# Patient Record
Sex: FEMALE | Race: BLACK OR AFRICAN AMERICAN | NOT HISPANIC OR LATINO | Employment: FULL TIME | URBAN - METROPOLITAN AREA
[De-identification: names, ages, dates, MRNs, and addresses within clinical notes are randomized per-mention and may not be internally consistent; named-entity substitution may affect disease eponyms.]

---

## 2018-03-15 ENCOUNTER — PATIENT OUTREACH (OUTPATIENT)
Dept: ADMINISTRATIVE | Facility: HOSPITAL | Age: 43
End: 2018-03-15

## 2018-03-15 NOTE — LETTER
March 15, 2018    Pedro Pablo Bazan  [address] [facility] [address]           [facility]  [address]  [facility] [address]  Phone: [phone] Dear Mrs. Bazan:    Dear Pedro Pablo Bazan    In the spirit of maintaining your good health, our system indicates that you have not been seen in the office in over 12 months and due for a visit.     Our system indicates that you are due for the following:   Health Maintenance Due   Topic Date Due    TETANUS VACCINE  08/21/1992    Pap Smear with HPV Cotest  08/21/1995    Mammogram  08/21/2014    Influenza Vaccine  08/01/2017     Mic Arvizu MD would like you to schedule an appointment for an annual exam at your earliest convenience. If you have completed any of these health maintenance requirements at an outside facility, please contact our office so we may update your health record.    If your PRIMARY CARE PHYSICIAN has changed please contact your insurance company to reflect the correct physician.    If you have any issues or need assistance in scheduling this appointment, please call the number below.       If you have any questions or concerns, please don't hesitate to call.    Sincerely,    LANA Heard  Care Coordination Department  [facility]  [phone]

## 2018-03-15 NOTE — PROGRESS NOTES
Contact pt to schedule a Pap, physical and update health maintenance. Pt stated she has to check with insurance and will call back. A letter mail with contact # for reference.

## 2018-06-22 DIAGNOSIS — Z12.39 BREAST CANCER SCREENING: ICD-10-CM

## 2019-09-12 ENCOUNTER — HOSPITAL ENCOUNTER (OUTPATIENT)
Dept: RADIOLOGY | Facility: HOSPITAL | Age: 44
Discharge: HOME | End: 2019-09-12
Attending: NURSE PRACTITIONER
Payer: COMMERCIAL

## 2019-09-12 ENCOUNTER — OFFICE VISIT (OUTPATIENT)
Dept: FAMILY MEDICINE | Facility: CLINIC | Age: 44
End: 2019-09-12
Payer: COMMERCIAL

## 2019-09-12 VITALS
WEIGHT: 119.5 LBS | TEMPERATURE: 98 F | SYSTOLIC BLOOD PRESSURE: 140 MMHG | BODY MASS INDEX: 20.4 KG/M2 | DIASTOLIC BLOOD PRESSURE: 76 MMHG | HEART RATE: 71 BPM | HEIGHT: 64 IN

## 2019-09-12 DIAGNOSIS — M25.511 CHRONIC PAIN OF BOTH SHOULDERS: ICD-10-CM

## 2019-09-12 DIAGNOSIS — M25.512 CHRONIC PAIN OF BOTH SHOULDERS: ICD-10-CM

## 2019-09-12 DIAGNOSIS — S19.9XXD: ICD-10-CM

## 2019-09-12 DIAGNOSIS — R49.0 HOARSENESS: ICD-10-CM

## 2019-09-12 DIAGNOSIS — G89.29 CHRONIC PAIN OF BOTH SHOULDERS: ICD-10-CM

## 2019-09-12 DIAGNOSIS — S19.9XXD: Primary | ICD-10-CM

## 2019-09-12 PROCEDURE — 99999 PR PBB SHADOW E&M-EST. PATIENT-LVL V: ICD-10-PCS | Mod: PBBFAC,,, | Performed by: NURSE PRACTITIONER

## 2019-09-12 PROCEDURE — 76536 US EXAM OF HEAD AND NECK: CPT | Mod: TC,PO

## 2019-09-12 PROCEDURE — 76536 US SOFT TISSUE HEAD NECK THYROID: ICD-10-PCS | Mod: 26,,, | Performed by: RADIOLOGY

## 2019-09-12 PROCEDURE — 70360 X-RAY EXAM OF NECK: CPT | Mod: 26,,, | Performed by: RADIOLOGY

## 2019-09-12 PROCEDURE — 99214 PR OFFICE/OUTPT VISIT, EST, LEVL IV, 30-39 MIN: ICD-10-PCS | Mod: S$GLB,,, | Performed by: NURSE PRACTITIONER

## 2019-09-12 PROCEDURE — 76536 US EXAM OF HEAD AND NECK: CPT | Mod: 26,,, | Performed by: RADIOLOGY

## 2019-09-12 PROCEDURE — 99214 OFFICE O/P EST MOD 30 MIN: CPT | Mod: S$GLB,,, | Performed by: NURSE PRACTITIONER

## 2019-09-12 PROCEDURE — 73030 X-RAY EXAM OF SHOULDER: CPT | Mod: 26,,, | Performed by: RADIOLOGY

## 2019-09-12 PROCEDURE — 73030 XR SHOULDER COMPLETE 2 OR MORE VIEWS BILATERAL: ICD-10-PCS | Mod: 26,,, | Performed by: RADIOLOGY

## 2019-09-12 PROCEDURE — 99999 PR PBB SHADOW E&M-EST. PATIENT-LVL V: CPT | Mod: PBBFAC,,, | Performed by: NURSE PRACTITIONER

## 2019-09-12 PROCEDURE — 3008F BODY MASS INDEX DOCD: CPT | Mod: CPTII,S$GLB,, | Performed by: NURSE PRACTITIONER

## 2019-09-12 PROCEDURE — 73030 X-RAY EXAM OF SHOULDER: CPT | Mod: TC,50,PO

## 2019-09-12 PROCEDURE — 70360 XR NECK SOFT TISSUE: ICD-10-PCS | Mod: 26,,, | Performed by: RADIOLOGY

## 2019-09-12 PROCEDURE — 70360 X-RAY EXAM OF NECK: CPT | Mod: TC,PO

## 2019-09-12 PROCEDURE — 3008F PR BODY MASS INDEX (BMI) DOCUMENTED: ICD-10-PCS | Mod: CPTII,S$GLB,, | Performed by: NURSE PRACTITIONER

## 2019-09-12 RX ORDER — MELOXICAM 7.5 MG/1
7.5 TABLET ORAL DAILY
Qty: 30 TABLET | Refills: 0 | Status: SHIPPED | OUTPATIENT
Start: 2019-09-12 | End: 2020-03-10

## 2019-09-12 NOTE — PROGRESS NOTES
Referring Provider:    Samia Weber, Np  [address] [place] [address]  Subjective:   Patient: Pedro Pablo Bazan [id number], :1975   Visit date:2019 10:42 AM    Chief Complaint:  Other (fall and hit neck on table 2wks ago) and Hoarse (started 2wks ago)    HPI:  Pedro Pablo is a 44 y.o. female who I was asked to see in consultation for evaluation of the following issue(s):    Patient reported a fall 2 weeks ago. She fell while in her kitchen hitting her anterior neck on the edge of her table. Since, she has had little to no voice. She has never had any issues with her voice prior to the fall. She denied any pain. No dysphagia. No open wounds/bleeding. She was seen by her PCP and w/u with US and Xray are insignificant. No other complaints. No relieving factors.     Review of Systems:  Negative unless checked off.  Gen:  []fever   []fatigue  HENT:  []nosebleeds  []dental problem   Eyes:  []photophobia  []visual disturbance  Resp:  []chest tightness []wheezing  Card:  []chest pain  []leg swelling  GI:  []abdominal pain []blood in stool  :  []dysuria  []hematuria  Musc:  []joint swelling  []gait problem  Skin:  []color change  []pallor  Neuro:  []seizures  []numbness  Hem:  []bruise/bleed easily  Psych:  []hallucinations  []behavioral problems  Allergy/Imm: has No Known Allergies.    Her meds, allergies, medical, surgical, social & family histories were reviewed & updated:  -     She has a current medication list which includes the following prescription(s): ferrous sulfate and meloxicam.  -     She  has no past medical history on file.   -     She does not have a problem list on file.   -     She  has no past surgical history on file.  -     She  reports that she has never smoked. She has never used smokeless tobacco.  -     Her family history is not on file.  -     She has No Known Allergies.    Objective:     Physical Exam:  Vitals:  /76   Pulse 90   Temp 99.1 °F (37.3 °C)  (Oral)   Wt 54.4 kg (120 lb)   LMP 08/22/2019   BMI 20.60 kg/m²   General appearance:  Well developed, well nourished    Eyes:  Extraocular motions intact, PERRL    Communication:  no hoarseness, no dysphonia    Ears:  Otoscopy of external auditory canals and tympanic membranes was normal, clinical speech reception thresholds grossly intact, no mass/lesion of auricle.  Nose:  No masses/lesions of external nose, nasal mucosa, septum, and turbinates were within normal limits.  Mouth:  No mass/lesion of lips, teeth, gums, hard/soft palate, tongue, tonsils, or oropharynx.    Cardiovascular:  No pedal edema; Radial Pulses +2     Neck & Lymphatics:  No cervical lymphadenopathy, no neck mass/crepitus/ asymmetry, trachea is midline, no thyroid enlargement/tenderness/mass.    Psych: Oriented x3,  Alert with normal mood and affect.     Respiration/Chest:  Symmetric expansion during respiration, normal respiratory effort.    Skin:  Warm and intact. No ulcerations of face, scalp, neck.    US SOFT TISSUE HEAD NECK THYROID    CLINICAL HISTORY:  Unspecified injury of neck, subsequent encounter    TECHNIQUE:  Targeted soft tissue ultrasound of the neck was performed.    COMPARISON:  Radiographs dated 09/12/2019    FINDINGS:  No discrete sonographic abnormality demonstrated at the area of concern in the neck.  No soft tissue masses or fluid collections visualized.  Thyroid gland was not imaged.      Impression       No abnormal sonographic findings.     XR NECK SOFT TISSUE    CLINICAL HISTORY:  Unspecified injury of neck, subsequent encounter    TECHNIQUE:  AP and lateral soft tissue views the neck were performed.    COMPARISON:  None.    FINDINGS:  There is visualization and T3 level on the lateral view.  Multilevel anterior and posterior marginal spurring.  There is anterior osteophyte formation resulting in the smooth the anterior bulging of the prevertebral soft tissues at the C2-3 and C3-4 levels.  Minimal posterior  subluxation C3 on C4 with concomitant loss of disc height at this level.  There is minimal loss of disc height throughout the remainder of the C-spine.  Prevertebral soft tissues are otherwise stable in thickness throughout the mid and upper C-spine.  No radiopaque foreign body or abnormal calcification.  No grossly evident fracture.      Impression       As above.  Follow-up and/or further evaluation as warranted.         Assessment & Plan:   Pedro Pablo was seen today for other and hoarse.    Diagnoses and all orders for this visit:    Trauma to vocal cord, initial encounter  -     Cancel: Ambulatory Referral to ENT  -     Ambulatory Referral to ENT    Vocal cord dysfunction  -     Cancel: Ambulatory Referral to ENT  -     Ambulatory Referral to ENT    Fall, initial encounter    Referral to Dr. Hager to discuss possible tx options  RTC PRN    We discussed her medical conditions, treatments and plan.  Pedro Pablo should return to clinic if any issues arise (symptoms worsen or persist), otherwise we will see her back in the clinic only as needed.    Thank you for allowing me to participate in the care of Pedro Pablo.      Annette Martell PA-C  [facility]   [facility]  [address]  [place] [address]  P: [phone]  F: [phone]      Patient: Pedro Pablo Bazan [id number], :1975  Procedure date:2019  Patient's medications, allergies, past medical, surgical, social and family histories were reviewed and updated as appropriate.  Chief Complaint:  Other (fall and hit neck on table 2wks ago) and Hoarse (started 2wks ago)    HPI:  Pedro Pablo is a 44 y.o. female with the history of present illness as discussed in the clinic note from today.    Procedure: Risks, benefits, and alternatives of the procedure were discussed with the patient, and the patient consented to the fiberoptic examination.  We applied a topical nasal decongestant and analgesic.  After adequate anesthesia  was obtained, the flexible fiberoptic scope was passed into each nostril independently.  Each nasal cavity, the entire pharynx (nasopharynx to hypopharynx) and the larynx were visualized. At the end of the examination, the scope was removed. The patient tolerated the procedure well with no complications.     Findings:  -     Laryngeal mucosa is normal  -     Posterior commissure has no hypertrophy  -     Lingual tonsils have no hypertrophy  -     Adenoids have no  hypertrophy  -     Right vocal fold: normal mobility     mass/lesion: none  -     Left vocal fold: reduced mobility     mass/lesion: none  -     Other findings: none    Assessment & Plan:  - see today's clinic note

## 2019-09-12 NOTE — PATIENT INSTRUCTIONS
"Report to ER immediately if symptoms worsen    Meloxicam: Patient drug information  Access [employer] here.  Copyright 3432-7700 [employer] All rights reserved.  (For additional information see "Meloxicam: Drug information" and see "Meloxicam: Pediatric drug information")  Brand Names: US  · Mobic;  · Qmiiz ODT;  · Vivlodex    Brand Names: Robert  · ACT Meloxicam;  · APO-Meloxicam;  · Auro-Meloxicam;  · DOM-Meloxicam;  · Mobicox [DSC];  · MYLAN-Meloxicam [DSC];  · PHL-Meloxicam [DSC];  · PMS-Meloxicam;  · TEVA-Meloxicam    Warning    This drug may raise the chance of heart and blood vessel side effects like heart attack and stroke. If these happen, they can be deadly. The risk of these side effects may be greater if you have heart disease or risks for heart disease. However, the risk may also be raised in people who do not have heart disease or risks for heart disease. The risk of these health problems can happen as soon as the first weeks of using this drug and may be greater with higher doses or with long-term use. Do not use this drug right before or after bypass heart surgery.    This drug may raise the chance of very bad and sometimes deadly stomach or bowel side effects like ulcers or bleeding. The risk is greater in older people. The risk is also greater in people who have had stomach or bowel ulcers or bleeding before. These problems may occur without warning signs. Talk with the doctor.    What is this drug used for?    It is used to treat arthritis.    It may be given to you for other reasons. Talk with the doctor.    What do I need to tell my doctor BEFORE I take this drug?    All products:    If you have an allergy to meloxicam or any other part of this drug.    If you have an allergy to aspirin or NSAIDs.    If you are allergic to any drugs like this one, any other drugs, foods, or other substances. Tell your doctor about the allergy and what signs you had, like rash; hives; itching; shortness " of breath; wheezing; cough; swelling of face, lips, tongue, or throat; or any other signs.    If you have any of these health problems: GI (gastrointestinal) bleeding or kidney problems.    If you have heart failure (weak heart).    If you have had a recent heart attack.    If you are taking any other NSAID, a salicylate drug like aspirin, or pemetrexed.    If you are having trouble getting pregnant or you are having your fertility checked.    If you are pregnant or may be pregnant. Do not take this drug if you are in the third trimester of pregnancy. You may also need to avoid this drug at other times during pregnancy. Talk with your doctor to see when you need to avoid taking this drug during pregnancy.    Suspension:    If you are taking sodium polystyrene sulfonate.    Oral-disintegrating tablet:    If you have phenylketonuria (PKU). This drug has phenylalanine in it.    This is not a list of all drugs or health problems that interact with this drug.    Tell your doctor and pharmacist about all of your drugs (prescription or OTC, natural products, vitamins) and health problems. You must check to make sure that it is safe for you to take this drug with all of your drugs and health problems. Do not start, stop, or change the dose of any drug without checking with your doctor.    What are some things I need to know or do while I take this drug?    Tell all of your health care providers that you take this drug. This includes your doctors, nurses, pharmacists, and dentists.    Have your blood work checked if you are on this drug for a long time. Talk with your doctor.    High blood pressure has happened with drugs like this one. Have your blood pressure checked as you have been told by your doctor.    Talk with your doctor before you drink alcohol.    If you smoke, talk with your doctor.    If you have asthma, talk with your doctor. You may be more sensitive to this drug.    You may bleed more easily. Be careful and  avoid injury. Use a soft toothbrush and an electric razor.    The chance of heart failure is raised with the use of drugs like this one. In people who already have heart failure, the chance of heart attack, having to go to the hospital for heart failure, and death is raised. Talk with the doctor.    The chance of heart attack and heart-related death is raised in people taking drugs like this one after a recent heart attack. People taking drugs like this one after a first heart attack were also more likely to die in the year after the heart attack compared with people not taking drugs like this one. Talk with the doctor.    If you are taking aspirin to help prevent a heart attack, talk with your doctor.    Liver problems have happened with drugs like this one. Sometimes, this has been deadly. Call your doctor right away if you have signs of liver problems like dark urine, feeling tired, not hungry, upset stomach or stomach pain, light-colored stools, throwing up, or yellow skin or eyes.    If you are 65 or older, use this drug with care. You could have more side effects.    NSAIDs like this drug may affect egg release (ovulation) in women. This may cause you to not be able to get pregnant. This goes back to normal when this drug is stopped. Talk with your doctor.    This drug may cause harm to the unborn baby if you take it while you are pregnant. If you are pregnant or you get pregnant while taking this drug, call your doctor right away.    Tell your doctor if you are breast-feeding. You will need to talk about any risks to your baby.    What are some side effects that I need to call my doctor about right away?    WARNING/CAUTION: Even though it may be rare, some people may have very bad and sometimes deadly side effects when taking a drug. Tell your doctor or get medical help right away if you have any of the following signs or symptoms that may be related to a very bad side effect:    Signs of an allergic  reaction, like rash; hives; itching; red, swollen, blistered, or peeling skin with or without fever; wheezing; tightness in the chest or throat; trouble breathing, swallowing, or talking; unusual hoarseness; or swelling of the mouth, face, lips, tongue, or throat.    Signs of bleeding like throwing up blood or throw up that looks like coffee grounds; coughing up blood; blood in the urine; black, red, or tarry stools; bleeding from the gums; vaginal bleeding that is not normal; bruises without a reason or that get bigger; or any bleeding that is very bad or that you cannot stop.    Signs of kidney problems like unable to pass urine, change in how much urine is passed, blood in the urine, or a big weight gain.    Signs of high potassium levels like a heartbeat that does not feel normal; feeling confused; feeling weak, lightheaded, or dizzy; feeling like passing out; numbness or tingling; or shortness of breath.    Signs of high blood pressure like very bad headache or dizziness, passing out, or change in eyesight.    Shortness of breath, a big weight gain, or swelling in the arms or legs.    Chest pain or pressure.    Weakness on 1 side of the body, trouble speaking or thinking, change in balance, drooping on one side of the face, or blurred eyesight.    Feeling very tired or weak.    Flu-like signs.    A very bad skin reaction (Murray-Madan syndrome/toxic epidermal necrolysis) may happen. It can cause very bad health problems that may not go away, and sometimes death. Get medical help right away if you have signs like red, swollen, blistered, or peeling skin (with or without fever); red or irritated eyes; or sores in your mouth, throat, nose, or eyes.    What are some other side effects of this drug?    All drugs may cause side effects. However, many people have no side effects or only have minor side effects. Call your doctor or get medical help if any of these side effects or any other side effects bother you or  do not go away:    Headache.    Constipation, diarrhea, stomach pain, upset stomach, throwing up, or feeling less hungry.    Heartburn.    Gas.    Dizziness.    Signs of a common cold.    These are not all of the side effects that may occur. If you have questions about side effects, call your doctor. Call your doctor for medical advice about side effects.    You may report side effects to your national health agency.    How is this drug best taken?    Use this drug as ordered by your doctor. Read all information given to you. Follow all instructions closely.    All products:    Take with or without food. Take with food if it causes an upset stomach.    Do not take more than what your doctor told you to take. Taking more than you are told may raise your chance of very bad side effects.    Do not take this drug for longer than you were told by your doctor.    Suspension:    Shake well before use.    Measure liquid doses carefully. Use the measuring device that comes with this drug. If there is none, ask the pharmacist for a device to measure this drug.    Oral-disintegrating tablet:    Do not take this drug out of the blister pack until you are ready to take it. Take this drug right away after opening the blister pack. Do not store the removed drug for future use.    Do not push the tablet out of the foil when opening. Use dry hands to take it from the foil. Place on your tongue and let it dissolve. Water is not needed. Do not chew, break, or crush the tablet.    What do I do if I miss a dose?    Take a missed dose as soon as you think about it.    If it is close to the time for your next dose, skip the missed dose and go back to your normal time.    Do not take 2 doses at the same time or extra doses.    How do I store and/or throw out this drug?    All products:    Store at room temperature.    Store in a dry place. Do not store in a bathroom.    Keep all drugs in a safe place. Keep all drugs out of the reach of  children and pets.    Throw away unused or  drugs. Do not flush down a toilet or pour down a drain unless you are told to do so. Check with your pharmacist if you have questions about the best way to throw out drugs. There may be drug take-back programs in your area.    Capsules, tablets, and suspension:    Keep lid tightly closed.    Capsules:    Store in original container.    Oral-disintegrating tablet:    Protect from heat.    General drug facts    If your symptoms or health problems do not get better or if they become worse, call your doctor.    Do not share your drugs with others and do not take anyone else's drugs.    Keep a list of all your drugs (prescription, natural products, vitamins, OTC) with you. Give this list to your doctor.    Talk with the doctor before starting any new drug, including prescription or OTC, natural products, or vitamins.    Some drugs may have another patient information leaflet. If you have any questions about this drug, please talk with your doctor, nurse, pharmacist, or other health care provider.    If you think there has been an overdose, call your poison control center or get medical care right away. Be ready to tell or show what was taken, how much, and when it happened.

## 2019-09-12 NOTE — PROGRESS NOTES
Subjective:       Patient ID: Pedro Pablo Bazan is a 44 y.o. female.    Chief Complaint: Fall; Hoarse; and Shoulder Pain    Fall   The accident occurred more than 1 week ago (2 w ago). The fall occurred while walking. She fell from a height of 1 to 2 ft. She landed on hard floor. There was no blood loss. The point of impact was the neck (anterior neck hit table). Pain location: Denies pain; states has had hoarseness since throat injury. The patient is experiencing no pain. Pertinent negatives include no abdominal pain, bowel incontinence, fever, headaches, hearing loss, hematuria, loss of consciousness, nausea, numbness, tingling, visual change or vomiting. Associated symptoms comments: hoarseness. She has tried nothing for the symptoms. The treatment provided no relief.   Shoulder Pain    The pain is present in the left shoulder and right shoulder. This is a chronic problem. The current episode started more than 1 year ago. There has been no history of extremity trauma. The problem occurs daily. The problem has been unchanged. The quality of the pain is described as aching. The pain is moderate. Associated symptoms include stiffness. Pertinent negatives include no fever, headaches, inability to bear weight, itching, joint locking, joint swelling, limited range of motion, numbness, tingling or visual symptoms. The symptoms are aggravated by activity. She has tried NSAIDS (BC powder) for the symptoms. The treatment provided moderate relief. Family history does not include arthritis. There is no history of diabetes, Injuries to Extremity or migraines.   History reviewed. No pertinent past medical history.  Social History     Socioeconomic History    Marital status: Single     Spouse name: Not on file    Number of children: Not on file    Years of education: Not on file    Highest education level: Not on file   Occupational History    Not on file   Social Needs    Financial resource strain: Not on file     Food insecurity:     Worry: Not on file     Inability: Not on file    Transportation needs:     Medical: Not on file     Non-medical: Not on file   Tobacco Use    Smoking status: Never Smoker   Substance and Sexual Activity    Alcohol use: Not on file    Drug use: Not on file    Sexual activity: Not on file   Lifestyle    Physical activity:     Days per week: Not on file     Minutes per session: Not on file    Stress: Not on file   Relationships    Social connections:     Talks on phone: Not on file     Gets together: Not on file     Attends [employer] service: Not on file     Active member of club or organization: Not on file     Attends meetings of clubs or organizations: Not on file     Relationship status: Not on file   Other Topics Concern    Not on file   Social History Narrative    Not on file     History reviewed. No pertinent surgical history.    Review of Systems   Constitutional: Negative.  Negative for fever.   HENT:        Throat injury, hoarseness   Eyes: Negative.    Respiratory: Negative.    Cardiovascular: Negative.    Gastrointestinal: Negative.  Negative for abdominal pain, bowel incontinence, nausea and vomiting.   Endocrine: Negative.    Genitourinary: Negative.  Negative for hematuria.   Musculoskeletal: Positive for arthralgias (bilateral shoulders) and stiffness.   Skin: Negative.  Negative for itching.   Allergic/Immunologic: Negative.    Neurological: Negative.  Negative for tingling, loss of consciousness, numbness and headaches.   Psychiatric/Behavioral: Negative.        Objective:      Physical Exam   Constitutional: She is oriented to person, place, and time. She appears well-developed and well-nourished.   HENT:   Head: Normocephalic and atraumatic.   Right Ear: External ear normal.   Left Ear: External ear normal.   Nose: Nose normal.   Mouth/Throat: Oropharynx is clear and moist.   Eyes: Pupils are equal, round, and reactive to light. Conjunctivae are normal.   Neck: Normal  range of motion. Neck supple.   Cardiovascular: Normal rate, regular rhythm and normal heart sounds.   Pulmonary/Chest: Effort normal and breath sounds normal.   Abdominal: Soft. Bowel sounds are normal.   Musculoskeletal: Normal range of motion.        Right shoulder: Normal. She exhibits normal range of motion, no tenderness, no bony tenderness, no swelling, no effusion, no crepitus, no deformity, no laceration, no pain, no spasm, normal pulse and normal strength.        Left shoulder: Normal. She exhibits normal range of motion, no tenderness, no bony tenderness, no swelling, no effusion, no crepitus, no deformity, no laceration, no pain, no spasm, normal pulse and normal strength.   Neurological: She is alert and oriented to person, place, and time.   Skin: Skin is warm and dry. Capillary refill takes 2 to 3 seconds.   Psychiatric: She has a normal mood and affect. Her behavior is normal. Judgment and thought content normal.       Assessment:       1. Injury of anterior neck, subsequent encounter    2. Hoarseness    3. Chronic pain of both shoulders        Plan:           Pedro Pablo was seen today for fall, hoarse and shoulder pain.    Diagnoses and all orders for this visit:    Injury of anterior neck, subsequent encounter  Hoarseness  -     US Soft Tissue Head Neck Thyroid; Future  -     Ambulatory referral to ENT  -     X-Ray Neck Soft Tissue; Future    Chronic pain of both shoulders  -     X-Ray Shoulder Complete Bilateral; Future  -     meloxicam (MOBIC) 7.5 MG tablet; Take 1 tablet (7.5 mg total) by mouth once daily.  Handout r/t mobic uses, potential interactions/side effects given

## 2019-09-13 ENCOUNTER — OFFICE VISIT (OUTPATIENT)
Dept: OTOLARYNGOLOGY | Facility: CLINIC | Age: 44
End: 2019-09-13
Payer: COMMERCIAL

## 2019-09-13 VITALS
HEART RATE: 90 BPM | SYSTOLIC BLOOD PRESSURE: 123 MMHG | DIASTOLIC BLOOD PRESSURE: 76 MMHG | BODY MASS INDEX: 20.6 KG/M2 | TEMPERATURE: 99 F | WEIGHT: 120 LBS
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
## 2019-09-13 DIAGNOSIS — J38.3 VOCAL CORD DYSFUNCTION: ICD-10-CM

## 2019-09-13 DIAGNOSIS — S19.83XA: Primary | ICD-10-CM

## 2019-09-13 PROCEDURE — 31575 PR LARYNGOSCOPY, FLEXIBLE; DIAGNOSTIC: ICD-10-PCS | Mod: S$GLB,,, | Performed by: PHYSICIAN ASSISTANT

## 2019-09-13 PROCEDURE — 31575 DIAGNOSTIC LARYNGOSCOPY: CPT | Mod: S$GLB,,, | Performed by: PHYSICIAN ASSISTANT

## 2019-09-13 PROCEDURE — 99243 OFF/OP CNSLTJ NEW/EST LOW 30: CPT | Mod: 25,S$GLB,, | Performed by: PHYSICIAN ASSISTANT

## 2019-09-13 PROCEDURE — 99243 PR OFFICE CONSULTATION,LEVEL III: ICD-10-PCS | Mod: 25,S$GLB,, | Performed by: PHYSICIAN ASSISTANT

## 2019-09-13 PROCEDURE — 99999 PR PBB SHADOW E&M-EST. PATIENT-LVL III: ICD-10-PCS | Mod: PBBFAC,,, | Performed by: PHYSICIAN ASSISTANT

## 2019-09-13 PROCEDURE — 99999 PR PBB SHADOW E&M-EST. PATIENT-LVL III: CPT | Mod: PBBFAC,,, | Performed by: PHYSICIAN ASSISTANT

## 2019-09-13 NOTE — LETTER
September 13, 2019      [place]  [address]  [place] [address]           [facility]  [address]  [place] [address]  Phone: [phone]  Fax: [phone]          Patient: Pedro Pablo Bazan   MR Number: [id number]   YOB: 1975   Date of Visit: 9/13/2019       Dear Samia Llamas:    Thank you for referring Pedro Pablo Bazan to me for evaluation. Attached you will find relevant portions of my assessment and plan of care.    If you have questions, please do not hesitate to call me. I look forward to following Pedro Pablo Bazan along with you.    Sincerely,    GRIFFIN Kaurosure  CC:  No Recipients    If you would like to receive this communication electronically, please contact [email or web address] or [phone] to request more information on [employer] Link access.    For providers and/or their staff who would like to refer a patient to [facility], please contact us through our one-stop-shop provider referral line, [facility], at [phone].    If you feel you have received this communication in error or would no longer like to receive these types of communications, please e-mail [email or web address]

## 2019-09-23 ENCOUNTER — TELEPHONE (OUTPATIENT)
Dept: OTOLARYNGOLOGY | Facility: CLINIC | Age: 44
End: 2019-09-23

## 2019-09-23 NOTE — TELEPHONE ENCOUNTER
----- Message from Liyah Matt RN sent at 9/18/2019  2:46 PM CDT -----  Scheduled for soonest, 9/26 at 9:20am.  Please confirm date/time with patient.  I will place info regarding appt date/time/location in mail for the patient.   Thanks,  MELI Pelletier    ----- Message -----  From: Bertha Moore LPN  Sent: 9/18/2019  10:25 AM  To: Madan De La Garza    Good Morning,    Referral placed by Annette Martell PA-C on 9/13/19 for trauma to vocal cord.  Please contact pt and schedule.    Thank you!  Bertha

## 2019-09-23 NOTE — TELEPHONE ENCOUNTER
Have attempted to contact pt 3+ times since 9/18/19 to give appt details.  No answer and voicemail is always full and I am unable to leave a message.  Appt details were also mailed to pt's house per Dr Hager's staff.

## 2019-10-10 RX ORDER — MELOXICAM 7.5 MG/1
TABLET ORAL
Qty: 30 TABLET | Refills: 0 | OUTPATIENT
Start: 2019-10-10

## 2020-06-16 ENCOUNTER — TELEPHONE (OUTPATIENT)
Dept: RHEUMATOLOGY | Facility: CLINIC | Age: 45
End: 2020-06-16

## 2020-06-16 NOTE — TELEPHONE ENCOUNTER
----- Message from Princess EZRA Fu sent at 6/16/2020 10:59 AM CDT -----  Contact: Guille (Sister)  Type: Needs Medical Advice  Who Called:  Guille (Sister)  Best Call Back Number:[phone]    Additional Information:requesting a call in regards to scheduling appt for repeating swollen of the lower extremities and trouble walking. Please call to assist

## 2020-06-17 ENCOUNTER — HOSPITAL ENCOUNTER (OUTPATIENT)
Dept: RADIOLOGY | Facility: HOSPITAL | Age: 45
Discharge: HOME | End: 2020-06-17
Attending: NURSE PRACTITIONER
Payer: COMMERCIAL

## 2020-06-17 ENCOUNTER — TELEPHONE (OUTPATIENT)
Dept: RHEUMATOLOGY | Facility: CLINIC | Age: 45
End: 2020-06-17

## 2020-06-17 ENCOUNTER — OFFICE VISIT (OUTPATIENT)
Dept: FAMILY MEDICINE | Facility: CLINIC | Age: 45
End: 2020-06-17
Payer: COMMERCIAL

## 2020-06-17 VITALS
SYSTOLIC BLOOD PRESSURE: 125 MMHG | HEART RATE: 67 BPM | DIASTOLIC BLOOD PRESSURE: 67 MMHG | WEIGHT: 128.38 LBS | HEIGHT: 64 IN | TEMPERATURE: 98 F | BODY MASS INDEX: 21.92 KG/M2

## 2020-06-17 DIAGNOSIS — M79.671 BILATERAL FOOT PAIN: ICD-10-CM

## 2020-06-17 DIAGNOSIS — M25.50 ARTHRALGIA, UNSPECIFIED JOINT: ICD-10-CM

## 2020-06-17 DIAGNOSIS — M25.579 ANKLE PAIN, UNSPECIFIED CHRONICITY, UNSPECIFIED LATERALITY: ICD-10-CM

## 2020-06-17 DIAGNOSIS — M79.672 BILATERAL FOOT PAIN: Primary | ICD-10-CM

## 2020-06-17 DIAGNOSIS — M79.672 BILATERAL FOOT PAIN: ICD-10-CM

## 2020-06-17 DIAGNOSIS — R60.9 EDEMA, UNSPECIFIED TYPE: ICD-10-CM

## 2020-06-17 DIAGNOSIS — M79.671 BILATERAL FOOT PAIN: Primary | ICD-10-CM

## 2020-06-17 PROCEDURE — 93005 ELECTROCARDIOGRAM TRACING: CPT | Mod: S$GLB,,, | Performed by: NURSE PRACTITIONER

## 2020-06-17 PROCEDURE — 73630 X-RAY EXAM OF FOOT: CPT | Mod: 26,,, | Performed by: RADIOLOGY

## 2020-06-17 PROCEDURE — 93005 EKG 12-LEAD: ICD-10-PCS | Mod: S$GLB,,, | Performed by: NURSE PRACTITIONER

## 2020-06-17 PROCEDURE — 3008F BODY MASS INDEX DOCD: CPT | Mod: CPTII,S$GLB,, | Performed by: NURSE PRACTITIONER

## 2020-06-17 PROCEDURE — 99999 PR PBB SHADOW E&M-EST. PATIENT-LVL IV: CPT | Mod: PBBFAC,,, | Performed by: NURSE PRACTITIONER

## 2020-06-17 PROCEDURE — 73610 X-RAY EXAM OF ANKLE: CPT | Mod: TC,50,PO

## 2020-06-17 PROCEDURE — 99213 OFFICE O/P EST LOW 20 MIN: CPT | Mod: S$GLB,,, | Performed by: NURSE PRACTITIONER

## 2020-06-17 PROCEDURE — 73630 X-RAY EXAM OF FOOT: CPT | Mod: TC,50,PO

## 2020-06-17 PROCEDURE — 93010 ELECTROCARDIOGRAM REPORT: CPT | Mod: S$GLB,,, | Performed by: INTERNAL MEDICINE

## 2020-06-17 PROCEDURE — 99213 PR OFFICE/OUTPT VISIT, EST, LEVL III, 20-29 MIN: ICD-10-PCS | Mod: S$GLB,,, | Performed by: NURSE PRACTITIONER

## 2020-06-17 PROCEDURE — 93010 EKG 12-LEAD: ICD-10-PCS | Mod: S$GLB,,, | Performed by: INTERNAL MEDICINE

## 2020-06-17 PROCEDURE — 3008F PR BODY MASS INDEX (BMI) DOCUMENTED: ICD-10-PCS | Mod: CPTII,S$GLB,, | Performed by: NURSE PRACTITIONER

## 2020-06-17 PROCEDURE — 73630 XR FOOT COMPLETE 3 VIEW BILATERAL: ICD-10-PCS | Mod: 26,,, | Performed by: RADIOLOGY

## 2020-06-17 PROCEDURE — 73610 X-RAY EXAM OF ANKLE: CPT | Mod: 26,,, | Performed by: RADIOLOGY

## 2020-06-17 PROCEDURE — 99999 PR PBB SHADOW E&M-EST. PATIENT-LVL IV: ICD-10-PCS | Mod: PBBFAC,,, | Performed by: NURSE PRACTITIONER

## 2020-06-17 PROCEDURE — 73610 XR ANKLE COMPLETE 3 VIEW BILATERAL: ICD-10-PCS | Mod: 26,,, | Performed by: RADIOLOGY

## 2020-06-17 RX ORDER — IBUPROFEN 200 MG
200 TABLET ORAL EVERY 6 HOURS PRN
COMMUNITY
End: 2020-06-17

## 2020-06-17 RX ORDER — DICLOFENAC SODIUM 75 MG/1
75 TABLET, DELAYED RELEASE ORAL 2 TIMES DAILY
Qty: 20 TABLET | Refills: 0 | Status: SHIPPED | OUTPATIENT
Start: 2020-06-17 | End: 2020-06-27

## 2020-06-17 NOTE — TELEPHONE ENCOUNTER
Pt declined the first available appointment with Dr. Means. She states she has a rheumatologist lined up

## 2020-06-17 NOTE — TELEPHONE ENCOUNTER
----- Message from Jessica Quezada sent at 6/17/2020 10:28 AM CDT -----  Contact: Patient  Type: Appointment Request    Caller is requesting a same day appointment.  Caller declined first available appointment listed below.      Name of Caller:  Patient  When is the first available appointment?  N/a  Symptoms:  feet and ankles are swollen. Can hardly walk  Best Call Back Number:  [phone]  Additional Information:     No referral.

## 2020-06-17 NOTE — PATIENT INSTRUCTIONS
"Elevate affected extremities while lying/sitting  No NSAIDs while taking voltaren  Report to ER immediately if symptoms worsen    Diclofenac (systemic): Patient drug information   Access [employer] here.   Copyright 0043-2067 [employer] All rights reserved.   (For additional information see "Diclofenac (systemic): Drug information" and see "Diclofenac (systemic): Pediatric drug information")  Warning    This drug may raise the risk of heart and blood vessel problems like heart attack and stroke. These effects can be deadly. The risk may be greater if you have heart disease or risks for heart disease. However, it can also be raised even if you do not have heart disease or risks for heart disease. The risk can happen within the first weeks of using this drug and may be greater with higher doses or long-term use. Do not use this drug right before or after bypass heart surgery.    This drug may raise the chance of severe and sometimes deadly stomach or bowel problems like ulcers or bleeding. The risk is greater in older people, and in people who have had stomach or bowel ulcers or bleeding before. These problems may occur without warning signs.  What is this drug used for?    It is used to ease pain.    It is used to treat some types of arthritis.    It is used to ease painful period (menstrual) cycles.    It is used to treat migraine headaches.    It may be given to you for other reasons. Talk with the doctor.  What do I need to tell my doctor BEFORE I take this drug?    If you are allergic to this drug; any part of this drug; or any other drugs, foods, or substances. Tell your doctor about the allergy and what signs you had.    If you have an allergy to aspirin or nonsteroidal anti-inflammatory drugs (NSAIDs) like ibuprofen or naproxen.    If you have ever had asthma caused by a salicylate drug like aspirin or a drug like this one like NSAIDs.    If you have any of these health problems: GI " (gastrointestinal) bleeding or kidney problems.    If you have had a recent heart attack.    If you have heart failure (weak heart).    If you are taking any other NSAID, a salicylate drug like aspirin, or pemetrexed.    If you are having trouble getting pregnant or you are having your fertility checked.    If you are pregnant or may be pregnant. Do not take this drug if you are in the third trimester of pregnancy. You may also need to avoid this drug at other times during pregnancy. Talk with your doctor to see when you need to avoid taking this drug during pregnancy.   This is not a list of all drugs or health problems that interact with this drug.   Tell your doctor and pharmacist about all of your drugs (prescription or OTC, natural products, vitamins) and health problems. You must check to make sure that it is safe for you to take this drug with all of your drugs and health problems. Do not start, stop, or change the dose of any drug without checking with your doctor.  What are some things I need to know or do while I take this drug?    Tell all of your health care providers that you take this drug. This includes your doctors, nurses, pharmacists, and dentists.    Have your blood work checked if you are on this drug for a long time. Talk with your doctor.    High blood pressure has happened with drugs like this one. Have your blood pressure checked as you have been told by your doctor.    Talk with your doctor before you drink alcohol.    If you smoke, talk with your doctor.    If you have asthma, talk with your doctor. You may be more sensitive to this drug.    Do not take more than what your doctor told you to take. Taking more than you are told may raise your chance of very bad side effects.    Do not take this drug for longer than you were told by your doctor.    You may bleed more easily. Be careful and avoid injury. Use a soft toothbrush and an electric razor.    The chance of heart failure  is raised with the use of drugs like this one. In people who already have heart failure, the chance of heart attack, having to go to the hospital for heart failure, and death is raised. Talk with the doctor.    The chance of heart attack and heart-related death is raised in people taking drugs like this one after a recent heart attack. People taking drugs like this one after a first heart attack were also more likely to die in the year after the heart attack compared with people not taking drugs like this one. Talk with the doctor.    If you are taking aspirin to help prevent a heart attack, talk with your doctor.    If you have phenylketonuria (PKU), talk with your doctor. Some products have phenylalanine.    Liver problems have happened with drugs like this one. Sometimes, this has been deadly. Call your doctor right away if you have signs of liver problems like dark urine, feeling tired, not hungry, upset stomach or stomach pain, light-colored stools, throwing up, or yellow skin or eyes.    If you are 65 or older, use this drug with care. You could have more side effects.    NSAIDs like this drug may affect egg release (ovulation) in women. This may cause you to not be able to get pregnant. This goes back to normal when this drug is stopped. Talk with your doctor.    This drug may cause harm to the unborn baby if you take it while you are pregnant. If you are pregnant or you get pregnant while taking this drug, call your doctor right away.    Tell your doctor if you are breast-feeding. You will need to talk about any risks to your baby.  What are some side effects that I need to call my doctor about right away?   WARNING/CAUTION: Even though it may be rare, some people may have very bad and sometimes deadly side effects when taking a drug. Tell your doctor or get medical help right away if you have any of the following signs or symptoms that may be related to a very bad side effect:    Signs of an  allergic reaction, like rash; hives; itching; red, swollen, blistered, or peeling skin with or without fever; wheezing; tightness in the chest or throat; trouble breathing, swallowing, or talking; unusual hoarseness; or swelling of the mouth, face, lips, tongue, or throat.    Signs of bleeding like throwing up or coughing up blood; vomit that looks like coffee grounds; blood in the urine; black, red, or tarry stools; bleeding from the gums; abnormal vaginal bleeding; bruises without a cause or that get bigger; or bleeding you cannot stop.    Signs of kidney problems like unable to pass urine, change in how much urine is passed, blood in the urine, or a big weight gain.    Signs of high potassium levels like a heartbeat that does not feel normal; feeling confused; feeling weak, lightheaded, or dizzy; feeling like passing out; numbness or tingling; or shortness of breath.    Signs of high blood pressure like very bad headache or dizziness, passing out, or change in eyesight.    Shortness of breath, a big weight gain, or swelling in the arms or legs.    Chest pain or pressure or a fast heartbeat.    Weakness on 1 side of the body, trouble speaking or thinking, change in balance, drooping on one side of the face, or blurred eyesight.    Feeling very tired or weak.    Pain when passing urine or blood in urine.    A very bad skin reaction (Murray-Madan syndrome/toxic epidermal necrolysis) may happen. It can cause very bad health problems that may not go away, and sometimes death. Get medical help right away if you have signs like red, swollen, blistered, or peeling skin (with or without fever); red or irritated eyes; or sores in your mouth, throat, nose, or eyes.  What are some other side effects of this drug?   All drugs may cause side effects. However, many people have no side effects or only have minor side effects. Call your doctor or get medical help if any of these side effects or any other side effects  bother you or do not go away:   All products:    Headache.    Upset stomach.    Constipation.    Dizziness.   All oral products:    Stomach pain or diarrhea.    Heartburn.    Throwing up.    Gas.    Feeling sleepy.    Sweating a lot.    Signs of a common cold.   Injection:    Pain where the shot was given.   These are not all of the side effects that may occur. If you have questions about side effects, call your doctor. Call your doctor for medical advice about side effects.   You may report side effects to your national health agency.  How is this drug best taken?   Use this drug as ordered by your doctor. Read all information given to you. Follow all instructions closely.   Tablets and capsules:    Some products may not work as well if taken with food. Be sure you know how to take this drug with regard to food. If you are not sure, talk with your doctor or pharmacist.    Take with a full glass of water.   Long-acting products:    Swallow whole. Do not chew, break, or crush.   Packets:    If you take this drug with food, it may not work as well. Talk with your doctor.    Empty contents of packet into 2 to 4 tablespoons (30 to 60 mL) of water, mix well, and drink it right away.   Injection:    It is given as a shot into a vein.  What do I do if I miss a dose?   Tablets and capsules:    Take a missed dose as soon as you think about it.    If it is close to the time for your next dose, skip the missed dose and go back to your normal time.    Do not take 2 doses at the same time or extra doses.   Packets:    Only 1 dose of this drug is needed. If you miss your dose, take it as soon as you think about it.   Injection:    Call your doctor to find out what to do.  How do I store and/or throw out this drug?   All oral products:    Store at room temperature in a dry place. Do not store in a bathroom.   Injection:    If you need to store this drug at home, talk with your doctor, nurse, or pharmacist  about how to store it.   All products:    Keep all drugs in a safe place. Keep all drugs out of the reach of children and pets.    Throw away unused or  drugs. Do not flush down a toilet or pour down a drain unless you are told to do so. Check with your pharmacist if you have questions about the best way to throw out drugs. There may be drug take-back programs in your area.  General drug facts    If your symptoms or health problems do not get better or if they become worse, call your doctor.    Do not share your drugs with others and do not take anyone else's drugs.    Some drugs may have another patient information leaflet. If you have any questions about this drug, please talk with your doctor, nurse, pharmacist, or other health care provider.    If you think there has been an overdose, call your poison control center or get medical care right away. Be ready to tell or show what was taken, how much, and when it happened.

## 2020-06-17 NOTE — PROGRESS NOTES
Subjective:       Patient ID: Pedro Pablo Bazan is a 44 y.o. female.    Chief Complaint: Foot Swelling (both ) and Ankle Pain (both, more to the left )    Ankle Pain   The incident occurred more than 1 week ago (> 3 m). There was no injury mechanism. The pain is present in the left foot, right foot, right ankle and left ankle (Pt states also has pain in multiple joints at times). The quality of the pain is described as aching. The pain is moderate. The pain has been intermittent since onset. Pertinent negatives include no inability to bear weight, loss of motion, loss of sensation, muscle weakness, numbness or tingling. She reports no foreign bodies present. The symptoms are aggravated by weight bearing. She has tried NSAIDs for the symptoms. The treatment provided mild relief.   Edema  This is a chronic problem. The current episode started more than 1 year ago. The problem occurs intermittently. The problem has been waxing and waning. Associated symptoms include arthralgias (bilateral ankle and foot pain; bilateral ). Pertinent negatives include no abdominal pain, anorexia, change in bowel habit, chest pain, chills, congestion, coughing, diaphoresis, fatigue, fever, headaches, joint swelling, myalgias, nausea, neck pain, numbness, rash, sore throat, swollen glands, urinary symptoms, vertigo, visual change, vomiting or weakness. The symptoms are aggravated by standing and walking. She has tried NSAIDs for the symptoms. The treatment provided mild relief.   History reviewed. No pertinent past medical history.  Social History     Socioeconomic History    Marital status: Single     Spouse name: Not on file    Number of children: Not on file    Years of education: Not on file    Highest education level: Not on file   Occupational History    Not on file   Social Needs    Financial resource strain: Not on file    Food insecurity     Worry: Not on file     Inability: Not on file    Transportation needs      Medical: Not on file     Non-medical: Not on file   Tobacco Use    Smoking status: Never Smoker    Smokeless tobacco: Never Used   Substance and Sexual Activity    Alcohol use: Not on file    Drug use: Not on file    Sexual activity: Not on file   Lifestyle    Physical activity     Days per week: Not on file     Minutes per session: Not on file    Stress: Not on file   Relationships    Social connections     Talks on phone: Not on file     Gets together: Not on file     Attends Nondenominational service: Not on file     Active member of club or organization: Not on file     Attends meetings of clubs or organizations: Not on file     Relationship status: Not on file   Other Topics Concern    Not on file   Social History Narrative    Not on file     History reviewed. No pertinent surgical history.    Review of Systems   Constitutional: Negative.  Negative for chills, diaphoresis, fatigue and fever.   HENT: Negative.  Negative for nasal congestion and sore throat.    Eyes: Negative.    Respiratory: Negative.  Negative for cough.    Cardiovascular: Negative.  Negative for chest pain.   Gastrointestinal: Negative.  Negative for abdominal pain, anorexia, change in bowel habit, nausea, vomiting and change in bowel habit.   Endocrine: Negative.    Genitourinary: Negative.    Musculoskeletal: Positive for arthralgias (bilateral ankle and foot pain; bilateral ). Negative for joint swelling, myalgias and neck pain.   Integumentary:  Negative for rash. Negative.   Allergic/Immunologic: Negative.    Neurological: Negative.  Negative for vertigo, tingling, weakness, numbness and headaches.   Psychiatric/Behavioral: Negative.          Objective:      Physical Exam  Vitals signs and nursing note reviewed.   Constitutional:       Appearance: Normal appearance.   HENT:      Head: Normocephalic.      Right Ear: Tympanic membrane, ear canal and external ear normal.      Left Ear: Tympanic membrane, ear canal and external ear normal.       Nose: Nose normal.      Mouth/Throat:      Mouth: Mucous membranes are moist.      Pharynx: Oropharynx is clear.   Eyes:      Conjunctiva/sclera: Conjunctivae normal.      Pupils: Pupils are equal, round, and reactive to light.   Neck:      Musculoskeletal: Normal range of motion and neck supple.   Cardiovascular:      Rate and Rhythm: Normal rate and regular rhythm.      Pulses: Normal pulses.      Heart sounds: Normal heart sounds.   Pulmonary:      Effort: Pulmonary effort is normal.      Breath sounds: Normal breath sounds.   Abdominal:      General: Bowel sounds are normal.      Palpations: Abdomen is soft.   Musculoskeletal: Normal range of motion.      Right ankle: She exhibits swelling (trace). She exhibits normal range of motion, no ecchymosis, no deformity, no laceration and normal pulse. No tenderness.      Left ankle: She exhibits swelling (trace). She exhibits normal range of motion, no ecchymosis, no deformity, no laceration and normal pulse. No tenderness.      Right foot: Normal range of motion and normal capillary refill. Tenderness (trace) present. No bony tenderness, swelling, crepitus, deformity or laceration.      Left foot: Normal range of motion and normal capillary refill. Swelling (trace) present. No tenderness, bony tenderness, crepitus, deformity or laceration.   Skin:     General: Skin is warm and dry.      Capillary Refill: Capillary refill takes 2 to 3 seconds.   Neurological:      Mental Status: She is alert and oriented to person, place, and time.   Psychiatric:         Mood and Affect: Mood normal.         Behavior: Behavior normal.         Thought Content: Thought content normal.         Judgment: Judgment normal.         Assessment:       1. Bilateral foot pain    2. Ankle pain, unspecified chronicity, unspecified laterality    3. Edema, unspecified type    4. Arthralgia, unspecified joint        Plan:           Pedro Pablo was seen today for foot swelling and ankle  pain.    Diagnoses and all orders for this visit:    Bilateral foot pain  Ankle pain, unspecified chronicity, unspecified laterality  -     X-Ray Ankle Complete Bilateral; Future  -     IN OFFICE EKG 12-LEAD (to Muse)  -     diclofenac (VOLTAREN) 75 MG EC tablet; Take 1 tablet (75 mg total) by mouth 2 (two) times daily. for 10 days        Handout r/t voltaren uses, potential side effects/interactions given    Edema, unspecified type  -     Brain Natriuretic Peptide; Future  -     Comprehensive metabolic panel; Future  -     TSH; Future    Arthralgia, unspecified joint  -     ALEXUS Screen w/Reflex; Future  -     Rheumatoid factor; Future  -     Uric acid; Future  -     diclofenac (VOLTAREN) 75 MG EC tablet; Take 1 tablet (75 mg total) by mouth 2 (two) times daily. for 10 days    Report to ER immediately if symptoms worsen

## 2020-06-18 ENCOUNTER — TELEPHONE (OUTPATIENT)
Dept: FAMILY MEDICINE | Facility: CLINIC | Age: 45
End: 2020-06-18

## 2020-06-18 NOTE — TELEPHONE ENCOUNTER
Left message on voice mail to return call 1st number, left message with fly member on 2nd number, see lab

## 2020-06-18 NOTE — TELEPHONE ENCOUNTER
----- Message from Annette Lama sent at 6/18/2020  1:34 PM CDT -----  Contact: pt  Type:  Test Results    Who Called: Pedro Pablo  Name of Test (Lab/Mammo/Etc): EKG, x-ray, lab  Date of Test: 06/17/2020  Ordering Provider: Johnny Dang  Where the test was performed: [facility]  Would the patient rather a call back or a response via [facility]? call  Best Call Back Number: [phone] or [phone]  Additional Information:

## 2020-06-19 ENCOUNTER — TELEPHONE (OUTPATIENT)
Dept: FAMILY MEDICINE | Facility: CLINIC | Age: 45
End: 2020-06-19

## 2020-06-19 DIAGNOSIS — R94.31 ABNORMAL EKG: Primary | ICD-10-CM

## 2020-06-19 NOTE — TELEPHONE ENCOUNTER
----- Message from Samia Llamas NP sent at 6/18/2020  3:33 PM CDT -----  Reviewed; EKG abnormal. Recommend cardiology for further evaluation.

## 2020-06-19 NOTE — TELEPHONE ENCOUNTER
----- Message from Erica Millard sent at 6/19/2020  9:12 AM CDT -----  Regarding: Test Results  Type:  Test Results    Who Called: PT  Name of Test (Lab/Mammo/Etc): LAB/XRAY/EKG  Date of Test: 06/19  Ordering Provider: JA  Where the test was performed: [facility]  Would the patient rather a call back or a response via [facility]? CALL BACK  Best Call Back Number: [phone]  Additional Information:

## 2020-06-22 ENCOUNTER — PATIENT OUTREACH (OUTPATIENT)
Dept: ADMINISTRATIVE | Facility: OTHER | Age: 45
End: 2020-06-22

## 2020-06-22 DIAGNOSIS — Z12.31 ENCOUNTER FOR SCREENING MAMMOGRAM FOR MALIGNANT NEOPLASM OF BREAST: Primary | ICD-10-CM

## 2020-06-22 NOTE — PROGRESS NOTES
Patient's chart was reviewed.   Immunizations not reconciled. Pt not in [facility] database    Health Maintenance was updated.  Mammogram appt/scheduling ticket tasked and sent via portal

## 2020-06-23 ENCOUNTER — OFFICE VISIT (OUTPATIENT)
Dept: PODIATRY | Facility: CLINIC | Age: 45
End: 2020-06-23
Payer: COMMERCIAL

## 2020-06-23 VITALS
WEIGHT: 121.63 LBS | HEART RATE: 76 BPM | DIASTOLIC BLOOD PRESSURE: 84 MMHG | SYSTOLIC BLOOD PRESSURE: 149 MMHG | HEIGHT: 64 IN | BODY MASS INDEX: 20.76 KG/M2

## 2020-06-23 DIAGNOSIS — M76.60 ACHILLES TENDINITIS, UNSPECIFIED LATERALITY: Primary | ICD-10-CM

## 2020-06-23 DIAGNOSIS — M19.072 DEGENERATIVE JOINT DISEASE OF LEFT ANKLE AND FOOT: ICD-10-CM

## 2020-06-23 DIAGNOSIS — R60.0 LOWER LEG EDEMA: ICD-10-CM

## 2020-06-23 PROCEDURE — 3008F BODY MASS INDEX DOCD: CPT | Mod: CPTII,S$GLB,, | Performed by: PODIATRIST

## 2020-06-23 PROCEDURE — 99999 PR PBB SHADOW E&M-EST. PATIENT-LVL III: ICD-10-PCS | Mod: PBBFAC,,, | Performed by: PODIATRIST

## 2020-06-23 PROCEDURE — 99203 OFFICE O/P NEW LOW 30 MIN: CPT | Mod: S$GLB,,, | Performed by: PODIATRIST

## 2020-06-23 PROCEDURE — 3008F PR BODY MASS INDEX (BMI) DOCUMENTED: ICD-10-PCS | Mod: CPTII,S$GLB,, | Performed by: PODIATRIST

## 2020-06-23 PROCEDURE — 99999 PR PBB SHADOW E&M-EST. PATIENT-LVL III: CPT | Mod: PBBFAC,,, | Performed by: PODIATRIST

## 2020-06-23 PROCEDURE — 99203 PR OFFICE/OUTPT VISIT, NEW, LEVL III, 30-44 MIN: ICD-10-PCS | Mod: S$GLB,,, | Performed by: PODIATRIST

## 2020-06-23 NOTE — LETTER
June 23, 2020      [facility]  [address] [address]  Phone: [phone]  Fax: [phone]       Patient: Pedro Pablo Bazan   YOB: 1975  Date of Visit: 06/23/2020    To Whom It May Concern:    Marylin Bazan  was at [facility] on 06/23/2020. She may return to work on 6/24/2020 with no restrictions. If you have any questions or concerns, or if I can be of further assistance, please do not hesitate to contact me.    Sincerely,        Ayush Ackerman MA

## 2020-06-23 NOTE — PROGRESS NOTES
Subjective:       Patient ID: Pedro Pablo Bazan is a 44 y.o. female.    Chief Complaint: Foot Pain (Pt c/o bilat foot and ankle pain. Pt reports occasional swelling in ankles. Pt states pain is more severe in left foot, she rates pain 5/10 at present. Non diabetic pt. Wears tennis shoes w/ socks. PCP Dr Arvizu.) and Ankle Pain      HPI: Pedro Pablo Bazan complains of moderate pains to the left posterior aspect of the ankle/lower leg. States pains are sharp and stabbing-like in nature. Pains are to the posterior aspect of the ankle joint, mostly with walking and standing. Rates the pains at approx. 5/10. States post-static dyskinesia to this area. Denies any recent identifiable trauma. Does limp with gait. States that she has started to utilize NSAID medications thus far for treatment with minimal relief. Pains have been present for the past several months and the pains have worsened over the past couple of weeks.  Reports that she works on her feet for long.  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  States walking and standing causes and/or exacerbates the symptoms. Patient's Primary Care Provider is Mic Arvizu MD.     Review of patient's allergies indicates:  No Known Allergies    History reviewed. No pertinent past medical history.    History reviewed. No pertinent family history.    Social History     Socioeconomic History    Marital status: Single     Spouse name: Not on file    Number of children: Not on file    Years of education: Not on file    Highest education level: Not on file   Occupational History    Not on file   Social Needs    Financial resource strain: Not on file    Food insecurity     Worry: Not on file     Inability: Not on file    Transportation needs     Medical: Not on file     Non-medical: Not on file   Tobacco Use    Smoking status: Never Smoker    Smokeless tobacco: Never Used   Substance and Sexual Activity    Alcohol use: Not on file    Drug use: Not on file     "Sexual activity: Not on file   Lifestyle    Physical activity     Days per week: Not on file     Minutes per session: Not on file    Stress: Not on file   Relationships    Social connections     Talks on phone: Not on file     Gets together: Not on file     Attends [employer] service: Not on file     Active member of club or organization: Not on file     Attends meetings of clubs or organizations: Not on file     Relationship status: Not on file   Other Topics Concern    Not on file   Social History Narrative    Not on file       History reviewed. No pertinent surgical history.    Review of Systems   Constitutional: Negative for activity change, appetite change, chills and fever.   HENT: Negative for sinus pain, sore throat and voice change.    Eyes: Negative for pain, redness and visual disturbance.   Respiratory: Negative for cough and shortness of breath.    Cardiovascular: Negative for chest pain and palpitations.   Gastrointestinal: Negative for diarrhea, nausea and vomiting.   Musculoskeletal: Positive for arthralgias and gait problem. Negative for back pain and joint swelling.   Skin: Negative for color change and wound.   Neurological: Negative for dizziness, weakness and numbness.   Psychiatric/Behavioral: The patient is not nervous/anxious.           Objective:   BP (!) 149/84   Pulse 76   Ht 5' 4" (1.626 m)   Wt 55.2 kg (121 lb 9.6 oz)   BMI 20.87 kg/m²     X-Ray Ankle Complete Bilateral  Narrative: EXAMINATION:  XR ANKLE COMPLETE 3 VIEW BILATERAL    CLINICAL HISTORY:  Pain in unspecified ankle and joints of unspecified foot    TECHNIQUE:  AP, lateral and oblique views of both ankles were performed.    COMPARISON:  None    FINDINGS:  Ankle mortise well maintained bilaterally.  Mild soft tissue swelling bilaterally, increased laterally.  Multi articular degenerative changes in the partially visualized midfoot regions.  No acute or healing fracture.  Impression: As above.    Electronically signed " by: Khoi Lou MD  Date:    06/17/2020  Time:    15:55  X-Ray Foot Complete Bilateral  Narrative: EXAMINATION:  XR FOOT COMPLETE 3 VIEW BILATERAL    CLINICAL HISTORY:  Pain in right foot    TECHNIQUE:  AP, lateral, and oblique views of both feet were performed.    COMPARISON:  Left ankle series August 30, 2016    FINDINGS:  Bilateral pes planus and mild hallux valgus deformities, slightly more prominent on the right.  Diffuse soft tissue swelling bilaterally, greater on the right particularly in the dorsal forefoot region.  Generalized osteopenia.  Multi articular degenerative changes.  No acute or healing fracture.  Mildly limited evaluation of the phalanges bilaterally secondary positioning.  Minimal early plantar calcaneal spurs.  Impression: As above.    Electronically signed by: Khoi Lou MD  Date:    06/17/2020  Time:    15:54       Physical Exam    LOWER EXTREMITY PHYSICAL EXAMINATION    ORTHOPEDIC: There is mild tenderness to palpation along the course of the Achilles tendon on the left lower extremity.  There is no discomfort to palpation of the Achilles tendon upon its insertion onto the calcaneus at the superior border. Upon medial to lateral compression of the heel bone at the distal most insertion of the achilles tendon, there is moderate discomfort.  There is no fusiform edema noted along the course of the Achilles tendon. There are no defects noted along the course of the Achilles tendon.  MMT in the sagittal plane with dorsiflexion is 5/5 and with plantarflexion, it is too 5/5, but with pain and gaurding. Plantarflexion ability on this limb is decreased as compared to contra-lateral. Ankle ROM is not painful but is limited with decreased range of motion in dorsiflexion and plantar flexion.. Equinus is noted bilaterally.  Significant pes planus foot deformity.  No pain on palpation of the posterior tibial tendon, medial deltoid, navicular tuberosity. Gait pattern is antalgic at present.      DERMATOLOGY: There is no noted erythema or cellulitis noted. No ecchymosis is noted. Skin is supple, dry and intact. There is no palpable bursa noted at the achilles tendon insertion.  Skin is moist.  No ulcerations.  No calluses.     NEUROLOGY: Proprioception is intact, bilateral. Sensation to light touch is intact. Negative Tinel's Sign and negative Valleix sign. No neurological sensations with compression of the area of Da Silva's Nerve in the area of the Abductor Hallucis muscle belly.    VASCULAR: Dorsalis pedis pulse on the right 2/4, on the left 2/4.  Posterior tibial pulse on the right 2/4, on the left 2/4.  Capillary refill intact less than 3 sec of bilateral lower extremities.  Pedal hair growth is present to the dorsal aspect of the foot and digits bilaterally.  No presence of edema to lower extremities. Rubor on dependency is noted.     Assessment:     1. Achilles tendinitis, unspecified laterality - Left Foot    2. 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  There is significant degeneration noted within the ankle joint, subtalar joint, talonavicular joint.  Patient does have pes planus foot deformity as well.  Thorough discussion is had with the patient this afternoon, concerning the diagnosis, its etiology, and the treatment algorithm at present.  Discussed the importance of daily stretching exercises with 4-6 times per day.  Discussed continuing utilizing anti-inflammatory therapy but would also recommend heel lifts to decrease the tension being applied to the posterior muscle group in the Achilles tendon.  This will limit excessive strain being applied to the tendon on the posterior superior aspect the calcaneus.  Also  recommend icing which will help decrease the associated inflammation and pain/discomfort following long work hours.    Encourage utilization of compression socks to help decrease bilateral lower leg edema.    Patient follow-up in 1 month for re-evaluation of new or worsening symptoms.        Future Appointments   Date Time Provider Department Center   7/6/2020  1:30 PM Francisco Beckham Jr., MD [facility]   7/28/2020  2:00 PM Yoly Day DPM [facility]

## 2020-06-30 ENCOUNTER — TELEPHONE (OUTPATIENT)
Dept: FAMILY MEDICINE | Facility: CLINIC | Age: 45
End: 2020-06-30

## 2020-06-30 NOTE — TELEPHONE ENCOUNTER
----- Message from Narcisa Ross sent at 6/30/2020 11:09 AM CDT -----  Regarding: appt  Contact: pt  Type:  Same Day Appointment Request    Caller is requesting a same day appointment.  Caller declined first available appointment listed below.    Name of Caller: pt  When is the first available appointment? 07/01/2020  Symptoms: ankle / body pain  Best Call Back Number: [phone]  Additional Information: n/a

## 2020-07-05 ENCOUNTER — PATIENT OUTREACH (OUTPATIENT)
Dept: ADMINISTRATIVE | Facility: OTHER | Age: 45
End: 2020-07-05

## 2020-07-06 ENCOUNTER — OFFICE VISIT (OUTPATIENT)
Dept: CARDIOLOGY | Facility: CLINIC | Age: 45
End: 2020-07-06
Payer: COMMERCIAL

## 2020-07-06 ENCOUNTER — OFFICE VISIT (OUTPATIENT)
Dept: PODIATRY | Facility: CLINIC | Age: 45
End: 2020-07-06
Payer: COMMERCIAL

## 2020-07-06 VITALS
BODY MASS INDEX: 19.63 KG/M2 | DIASTOLIC BLOOD PRESSURE: 78 MMHG | HEART RATE: 75 BPM | WEIGHT: 115 LBS | SYSTOLIC BLOOD PRESSURE: 136 MMHG | HEIGHT: 64 IN

## 2020-07-06 VITALS
BODY MASS INDEX: 22.07 KG/M2 | WEIGHT: 129.31 LBS | SYSTOLIC BLOOD PRESSURE: 151 MMHG | HEART RATE: 81 BPM | HEIGHT: 64 IN | DIASTOLIC BLOOD PRESSURE: 89 MMHG

## 2020-07-06 DIAGNOSIS — M21.42 ACQUIRED PES PLANUS, LEFT: ICD-10-CM

## 2020-07-06 DIAGNOSIS — M25.572 PAIN IN LEFT ANKLE AND JOINTS OF LEFT FOOT: ICD-10-CM

## 2020-07-06 DIAGNOSIS — M76.822 POSTERIOR TIBIAL TENDON DYSFUNCTION (PTTD) OF LEFT LOWER EXTREMITY: Primary | ICD-10-CM

## 2020-07-06 DIAGNOSIS — R94.31 ABNORMAL EKG: ICD-10-CM

## 2020-07-06 DIAGNOSIS — M24.572 CONTRACTURE, LEFT ANKLE: ICD-10-CM

## 2020-07-06 PROCEDURE — 99999 PR PBB SHADOW E&M-EST. PATIENT-LVL III: CPT | Mod: PBBFAC,,, | Performed by: PODIATRIST

## 2020-07-06 PROCEDURE — 3008F PR BODY MASS INDEX (BMI) DOCUMENTED: ICD-10-PCS | Mod: CPTII,S$GLB,, | Performed by: PODIATRIST

## 2020-07-06 PROCEDURE — 99214 PR OFFICE/OUTPT VISIT, EST, LEVL IV, 30-39 MIN: ICD-10-PCS | Mod: S$GLB,,, | Performed by: PODIATRIST

## 2020-07-06 PROCEDURE — 3008F BODY MASS INDEX DOCD: CPT | Mod: CPTII,S$GLB,, | Performed by: PODIATRIST

## 2020-07-06 PROCEDURE — 99203 PR OFFICE/OUTPT VISIT, NEW, LEVL III, 30-44 MIN: ICD-10-PCS | Mod: S$GLB,,, | Performed by: INTERNAL MEDICINE

## 2020-07-06 PROCEDURE — 99214 OFFICE O/P EST MOD 30 MIN: CPT | Mod: S$GLB,,, | Performed by: PODIATRIST

## 2020-07-06 PROCEDURE — 99999 PR PBB SHADOW E&M-EST. PATIENT-LVL III: ICD-10-PCS | Mod: PBBFAC,,, | Performed by: PODIATRIST

## 2020-07-06 PROCEDURE — 99203 OFFICE O/P NEW LOW 30 MIN: CPT | Mod: S$GLB,,, | Performed by: INTERNAL MEDICINE

## 2020-07-06 PROCEDURE — 99999 PR PBB SHADOW E&M-EST. PATIENT-LVL III: ICD-10-PCS | Mod: PBBFAC,,, | Performed by: INTERNAL MEDICINE

## 2020-07-06 PROCEDURE — 3008F PR BODY MASS INDEX (BMI) DOCUMENTED: ICD-10-PCS | Mod: CPTII,S$GLB,, | Performed by: INTERNAL MEDICINE

## 2020-07-06 PROCEDURE — 3008F BODY MASS INDEX DOCD: CPT | Mod: CPTII,S$GLB,, | Performed by: INTERNAL MEDICINE

## 2020-07-06 PROCEDURE — 99999 PR PBB SHADOW E&M-EST. PATIENT-LVL III: CPT | Mod: PBBFAC,,, | Performed by: INTERNAL MEDICINE

## 2020-07-06 RX ORDER — NAPROXEN SODIUM 220 MG/1
81 TABLET, FILM COATED ORAL DAILY
COMMUNITY

## 2020-07-06 NOTE — PROGRESS NOTES
Subjective:       Patient ID: Pedro Pablo Bazan is a 44 y.o. female.    Chief Complaint: Foot Problem (pt c/o swelling and pain l. foot rates pain 10/10 tennis non diabetic pt pcp Dr. Arvizu.)      HPI: Pedro Pablo Bazan presents to the office with the chief complaint of pains to the left foot and ankle at the medial aspect. The pains are rated as 10/10 and are described as moderate to severe in nature. The pains have been on going now for the past several weeks to a month or so, and are worsening. The patient denies trauma. The patient states NSAIDs for management.  Patient has been evaluated by a medical professional prior.  She does state prior x-rays.  Has been taking Diclofenac orally.  The patient states that prolonged walking and standing exacerbates and causes the symptoms. The patient does state mild associated swelling as well. Patient's Primary Care Provider is Mic Arvizu MD.     Review of patient's allergies indicates:  No Known Allergies    History reviewed. No pertinent past medical history.    History reviewed. No pertinent family history.    Social History     Socioeconomic History    Marital status: Single     Spouse name: Not on file    Number of children: Not on file    Years of education: Not on file    Highest education level: Not on file   Occupational History    Not on file   Social Needs    Financial resource strain: Not on file    Food insecurity     Worry: Not on file     Inability: Not on file    Transportation needs     Medical: Not on file     Non-medical: Not on file   Tobacco Use    Smoking status: Never Smoker    Smokeless tobacco: Never Used   Substance and Sexual Activity    Alcohol use: Not on file    Drug use: Not on file    Sexual activity: Not on file   Lifestyle    Physical activity     Days per week: Not on file     Minutes per session: Not on file    Stress: Not on file   Relationships    Social connections     Talks on phone: Not on file     Gets  "together: Not on file     Attends Hoahaoism service: Not on file     Active member of club or organization: Not on file     Attends meetings of clubs or organizations: Not on file     Relationship status: Not on file   Other Topics Concern    Not on file   Social History Narrative    Not on file       History reviewed. No pertinent surgical history.    Review of Systems   Constitutional: Negative for chills, fatigue and fever.   HENT: Negative for hearing loss.    Eyes: Negative for photophobia and visual disturbance.   Respiratory: Negative for cough, chest tightness, shortness of breath and wheezing.    Cardiovascular: Negative for chest pain and palpitations.   Gastrointestinal: Negative for constipation, diarrhea, nausea and vomiting.   Endocrine: Negative for cold intolerance and heat intolerance.   Genitourinary: Negative for flank pain.   Musculoskeletal: Positive for gait problem. Negative for neck pain and neck stiffness.   Skin: Negative for wound.   Neurological: Negative for light-headedness and headaches.   Psychiatric/Behavioral: Negative for sleep disturbance.         Objective:   BP (!) 151/89   Pulse 81   Ht 5' 4" (1.626 m)   Wt 58.7 kg (129 lb 4.8 oz)   BMI 22.19 kg/m²     X-Ray Ankle Complete Bilateral  Narrative: EXAMINATION:  XR ANKLE COMPLETE 3 VIEW BILATERAL    CLINICAL HISTORY:  Pain in unspecified ankle and joints of unspecified foot    TECHNIQUE:  AP, lateral and oblique views of both ankles were performed.    COMPARISON:  None    FINDINGS:  Ankle mortise well maintained bilaterally.  Mild soft tissue swelling bilaterally, increased laterally.  Multi articular degenerative changes in the partially visualized midfoot regions.  No acute or healing fracture.  Impression: As above.    Electronically signed by: Khoi Lou MD  Date:    06/17/2020  Time:    15:55  X-Ray Foot Complete Bilateral  Narrative: EXAMINATION:  XR FOOT COMPLETE 3 VIEW BILATERAL    CLINICAL HISTORY:  Pain in right " foot    TECHNIQUE:  AP, lateral, and oblique views of both feet were performed.    COMPARISON:  Left ankle series August 30, 2016    FINDINGS:  Bilateral pes planus and mild hallux valgus deformities, slightly more prominent on the right.  Diffuse soft tissue swelling bilaterally, greater on the right particularly in the dorsal forefoot region.  Generalized osteopenia.  Multi articular degenerative changes.  No acute or healing fracture.  Mildly limited evaluation of the phalanges bilaterally secondary positioning.  Minimal early plantar calcaneal spurs.  Impression: As above.    Electronically signed by: Khoi Lou MD  Date:    06/17/2020  Time:    15:54      Physical Exam    LOWER EXTREMITY PHYSICAL EXAMINATION    VASCULAR: Pulses are palpable to the B/L lower extremity. The right dorsalis pedis pulse is 2/4 and the posterior tibial pulse is 2/4. The left dorsalis pedis pulse is 2/4 and the posterior tibial pulse is 2/4. Hair growth is noted on the dorsal foot and digits. Proximal to distal, warm to warm. Capillary refill time is WNL at less than 3s.    DERMATOLOGY: Skin is supple, dry and intact. No ecchymosis is noted. No hypertrophic skin formation. No erythema or cellulitis is noted.     ORTHOPEDIC: There is severe collapsing pes planovalgus on the left foot. There is severe tenderness to palpation of the navicular tuberosity on the left foot. There is moderate edema noted along the course of the PT tendon on the left foot. There is moderate retro-malleolar edema (medial malleolus). There is mild pain to palpation at the spring ligament and the deltoid ligaments. There is no apparent pains to palpation of the medial malleolus.  Equinus contracture is noted.  No pain with palpation and/or range of motion of the ankle joint.  There is no crepitus noted with range of motion of the ankle joint. The ankle is not in valgus.  There is moderate limitation to ROM of the STJ and the MTJ. The hindfoot is in valgus.  Upon standing, there is severe keeley-talar subluxation noted on the left foot. There is severe forefoot/midfoot abduction on the hindfoot.  RCSP is valgus. The deformity is supple. The patient is able to double heel rise, but has difficulties with single heel rise on the left foot. Gait pattern is antalgic at this time.     NEUROLOGY: Protective sensation is intact via 5.07 [place] Inderjit monofilament. Proprioception is intact. Sensation to light touch is intact. Upon palpation of the interspaces, there are no neurological sensations stated that radiate proximal or distal. Upon compression of the metatarsal heads from medial to lateral, no neurological sensations or symptoms are stated.    Assessment:     1. Posterior tibial tendon dysfunction (PTTD) of left lower extremity    2. Pain in left ankle and joints of left foot    3. Acquired pes planus, left    4. Contracture, left ankle        Plan:     Posterior tibial tendon dysfunction (PTTD) of left lower extremity  -     Cancel: X-Ray Foot Complete Left; Future; Expected date: 07/06/2020    Pain in left ankle and joints of left foot  -     Cancel: X-Ray Foot Complete Left; Future; Expected date: 07/06/2020    Acquired pes planus, left  -     Cancel: X-Ray Foot Complete Left; Future; Expected date: 07/06/2020    Contracture, left ankle        Thorough discussion is had with the patient today, concerning the diagnosis, its etiology, and the treatment algorithm at present.  XRAYS are reviewed in detail with the patient. All questions and concerns regarding findings and its/their implications are outlined and discussed.    Patient will continue oral Voltaren, twice daily until follow-up.  Patient will need a walking boot.    CAM Walker (Walking Boot), short/tall is dispensed to the patient. The CAM Walker is appropriately fitted and customized to the patient's lower extremity physique by the LPN/MA. Patient to ambulate with the CAM Walker at all times. The patient  should not sleep with the device or shower with the device, or drive with the device (if dispensed for right ankle/foot pathology).    Work excuse is provided for the next 2 weeks.               Future Appointments   Date Time Provider Department Center   7/6/2020  1:30 PM Francisco Beckham Jr., MD [facility]

## 2020-07-06 NOTE — PROGRESS NOTES
Subjective:    Patient ID:  Pedro Pablo Bazan is a 44 y.o. female who presents for evaluation of new pt (new pt- ref from pcp)      HPIPleasant 45 yo BF with hx of joint and tendon problems referred for abnormal EKG. Denies any hx of heart disease. No CP or SOB. Gets some ankle edema do to longstanding joint problems.    Review of Systems   Constitution: Negative for decreased appetite, fever, malaise/fatigue, weight gain and weight loss.   HENT: Negative for hearing loss and nosebleeds.    Eyes: Negative for visual disturbance.   Cardiovascular: Negative for chest pain, claudication, cyanosis, dyspnea on exertion, irregular heartbeat, leg swelling, near-syncope, orthopnea, palpitations, paroxysmal nocturnal dyspnea and syncope.   Respiratory: Negative for cough, hemoptysis, shortness of breath, sleep disturbances due to breathing, snoring and wheezing.    Endocrine: Negative for cold intolerance, heat intolerance, polydipsia and polyuria.   Hematologic/Lymphatic: Negative for adenopathy and bleeding problem. Does not bruise/bleed easily.   Skin: Negative for color change, itching, poor wound healing, rash and suspicious lesions.   Musculoskeletal: Positive for arthritis and joint pain. Negative for back pain, falls, joint swelling, muscle cramps, muscle weakness and myalgias.   Gastrointestinal: Negative for bloating, abdominal pain, change in bowel habit, constipation, flatus, heartburn, hematemesis, hematochezia, hemorrhoids, jaundice, melena, nausea and vomiting.   Genitourinary: Negative for bladder incontinence, decreased libido, frequency, hematuria, hesitancy and urgency.   Neurological: Negative for brief paralysis, difficulty with concentration, excessive daytime sleepiness, dizziness, focal weakness, headaches, light-headedness, loss of balance, numbness, vertigo and weakness.   Psychiatric/Behavioral: Negative for altered mental status, depression and memory loss. The patient does not have insomnia  "and is not nervous/anxious.    Allergic/Immunologic: Negative for environmental allergies, hives and persistent infections.        Objective:    Physical Exam   Constitutional: She is oriented to person, place, and time. She appears well-developed and well-nourished.   /78   Pulse 75   Ht 5' 4" (1.626 m)   Wt 52.2 kg (115 lb)   BMI 19.74 kg/m²      HENT:   Head: Normocephalic and atraumatic.   Right Ear: External ear normal.   Left Ear: External ear normal.   Nose: Nose normal.   Mouth/Throat: Oropharynx is clear and moist.   Eyes: Pupils are equal, round, and reactive to light. Conjunctivae, EOM and lids are normal. Right eye exhibits no discharge. Left eye exhibits no discharge. Right conjunctiva has no hemorrhage. No scleral icterus.   Neck: Normal range of motion. Neck supple. No JVD present. No tracheal deviation present. No thyromegaly present.   Cardiovascular: Normal rate, regular rhythm, normal heart sounds and intact distal pulses. Exam reveals no gallop and no friction rub.   No murmur heard.  Pulmonary/Chest: Effort normal and breath sounds normal. No respiratory distress. She has no wheezes. She has no rales. She exhibits no tenderness. Breasts are symmetrical.   Abdominal: Soft. Bowel sounds are normal. She exhibits no distension and no mass. There is no hepatosplenomegaly or hepatomegaly. There is no abdominal tenderness. There is no rebound and no guarding.   Musculoskeletal: Normal range of motion.         General: No tenderness or edema.      Comments: Boot on left leg   Lymphadenopathy:     She has no cervical adenopathy.   Neurological: She is alert and oriented to person, place, and time. She displays normal reflexes. No cranial nerve deficit. Coordination normal.   Skin: Skin is warm and dry. No rash noted. No erythema. No pallor.   Psychiatric: She has a normal mood and affect. Her behavior is normal. Judgment and thought content normal.   Nursing note and vitals reviewed.      "   Assessment:       1. Abnormal EKG         Plan:     The finding of septal infarct on EKG is related to lead placement     With no clinical hx no further work up indicated at this time.      No orders of the defined types were placed in this encounter.    Follow up if symptoms worsen or fail to improve.

## 2020-07-06 NOTE — LETTER
July 6, 2020      [place]  [address] [facility] [address]  [place] [address]           [facility]  [address] [facility]  [place] [address]  Phone: [phone]  Fax: [phone]          Patient: Pedro Pablo Bazan   MR Number: [id number]   YOB: 1975   Date of Visit: 7/6/2020       Dear Samia Llamas:    Thank you for referring Pedro Pablo Bazan to me for evaluation. Attached you will find relevant portions of my assessment and plan of care.    If you have questions, please do not hesitate to call me. I look forward to following Pedro Pablo Bazan along with you.    Sincerely,    Francisco Beckham Jr., MD    Enclosure  CC:  No Recipients    If you would like to receive this communication electronically, please contact [email or web address] or [phone] to request more information on [employer] Link access.    For providers and/or their staff who would like to refer a patient to [facility], please contact us through our one-stop-shop provider referral line, [facility], at [phone].    If you feel you have received this communication in error or would no longer like to receive these types of communications, please e-mail [email or web address]

## 2020-07-06 NOTE — LETTER
July 6, 2020      [facility]  [address]  [place] [facility] [address]  Phone: [phone]  Fax: [phone]       Patient: Pedro Pablo Bazan   YOB: 1975  Date of Visit: 07/06/2020    To Whom It May Concern:    Marylin Bazan  was at [facility] on 07/06/2020. She may return to work on 07/20/2020 with no restrictions. If you have any questions or concerns, or if I can be of further assistance, please do not hesitate to contact me.    Sincerely,    Zandra Rivera MA

## 2020-07-06 NOTE — PROGRESS NOTES
Patient's chart was reviewed for overdue FRANCES topics.  Immunizations not reconciled.  Patient not found in LINKS.

## 2020-07-17 ENCOUNTER — PATIENT OUTREACH (OUTPATIENT)
Dept: ADMINISTRATIVE | Facility: OTHER | Age: 45
End: 2020-07-17

## 2020-07-17 NOTE — PROGRESS NOTES
Requested updates within Care Everywhere.  Patient's chart was reviewed for overdue FRANCES topics.  Immunizations not reconciled. Patient not found in LINKS.

## 2020-07-20 ENCOUNTER — OFFICE VISIT (OUTPATIENT)
Dept: PODIATRY | Facility: CLINIC | Age: 45
End: 2020-07-20
Payer: COMMERCIAL

## 2020-07-20 VITALS
HEIGHT: 64 IN | SYSTOLIC BLOOD PRESSURE: 141 MMHG | WEIGHT: 125.88 LBS | DIASTOLIC BLOOD PRESSURE: 81 MMHG | RESPIRATION RATE: 17 BRPM | HEART RATE: 70 BPM | BODY MASS INDEX: 21.49 KG/M2

## 2020-07-20 DIAGNOSIS — M25.572 PAIN IN LEFT ANKLE AND JOINTS OF LEFT FOOT: ICD-10-CM

## 2020-07-20 DIAGNOSIS — M24.572 CONTRACTURE, LEFT ANKLE: ICD-10-CM

## 2020-07-20 DIAGNOSIS — M76.822 POSTERIOR TIBIAL TENDON DYSFUNCTION (PTTD) OF LEFT LOWER EXTREMITY: Primary | ICD-10-CM

## 2020-07-20 DIAGNOSIS — M21.42 ACQUIRED PES PLANUS, LEFT: ICD-10-CM

## 2020-07-20 PROCEDURE — 99214 PR OFFICE/OUTPT VISIT, EST, LEVL IV, 30-39 MIN: ICD-10-PCS | Mod: S$GLB,,, | Performed by: PODIATRIST

## 2020-07-20 PROCEDURE — 99214 OFFICE O/P EST MOD 30 MIN: CPT | Mod: S$GLB,,, | Performed by: PODIATRIST

## 2020-07-20 PROCEDURE — 99999 PR PBB SHADOW E&M-EST. PATIENT-LVL III: ICD-10-PCS | Mod: PBBFAC,,, | Performed by: PODIATRIST

## 2020-07-20 PROCEDURE — 3008F BODY MASS INDEX DOCD: CPT | Mod: CPTII,S$GLB,, | Performed by: PODIATRIST

## 2020-07-20 PROCEDURE — 99999 PR PBB SHADOW E&M-EST. PATIENT-LVL III: CPT | Mod: PBBFAC,,, | Performed by: PODIATRIST

## 2020-07-20 PROCEDURE — 3008F PR BODY MASS INDEX (BMI) DOCUMENTED: ICD-10-PCS | Mod: CPTII,S$GLB,, | Performed by: PODIATRIST

## 2020-07-20 RX ORDER — IBUPROFEN 800 MG/1
800 TABLET ORAL 3 TIMES DAILY
Qty: 90 TABLET | Refills: 1 | Status: SHIPPED | OUTPATIENT
Start: 2020-07-20 | End: 2020-08-12 | Stop reason: ALTCHOICE

## 2020-07-20 NOTE — PROGRESS NOTES
Subjective:       Patient ID: Pedro Pablo Bazan is a 44 y.o. female.    Chief Complaint: Foot Problem (2 week boot check, wear walking boot,reports pain 0/10, non diabetic, PCP Dr. Arvizu)      HPI: Pedro Pablo Bazan presents to the office this morning for follow-up concerning left lower extremity PTTD.  Patient has been immobilized with a walking boot for the past 2 weeks.  Patient has also been out of work for 2 weeks.  She did order the [employer] Running Sneakers as instructed.  The sneakers well arrival next week.  Patient typically works Tuesday to Friday.  States antalgic gait pattern without the walking boot.  States the oral Voltaren is not helpful for her symptoms.  At this time, 0/10 pains are stated.  Without the boot, pains are typically 8/10.  Patient does state persistent swelling.        Review of patient's allergies indicates:  No Known Allergies    History reviewed. No pertinent past medical history.    History reviewed. No pertinent family history.    Social History     Socioeconomic History    Marital status: Single     Spouse name: Not on file    Number of children: Not on file    Years of education: Not on file    Highest education level: Not on file   Occupational History    Not on file   Social Needs    Financial resource strain: Not on file    Food insecurity     Worry: Not on file     Inability: Not on file    Transportation needs     Medical: Not on file     Non-medical: Not on file   Tobacco Use    Smoking status: Never Smoker    Smokeless tobacco: Never Used   Substance and Sexual Activity    Alcohol use: Not on file    Drug use: Not on file    Sexual activity: Not on file   Lifestyle    Physical activity     Days per week: Not on file     Minutes per session: Not on file    Stress: Not on file   Relationships    Social connections     Talks on phone: Not on file     Gets together: Not on file     Attends Confucianist service: Not on file     Active member of club or  "organization: Not on file     Attends meetings of clubs or organizations: Not on file     Relationship status: Not on file   Other Topics Concern    Not on file   Social History Narrative    Not on file       History reviewed. No pertinent surgical history.    Review of Systems   Constitutional: Negative for chills, fatigue and fever.   HENT: Negative for hearing loss.    Eyes: Negative for photophobia and visual disturbance.   Respiratory: Negative for cough, chest tightness, shortness of breath and wheezing.    Cardiovascular: Negative for chest pain and palpitations.   Gastrointestinal: Negative for constipation, diarrhea, nausea and vomiting.   Endocrine: Negative for cold intolerance and heat intolerance.   Genitourinary: Negative for flank pain.   Musculoskeletal: Positive for gait problem. Negative for neck pain and neck stiffness.   Skin: Negative for wound.   Neurological: Negative for light-headedness and headaches.   Psychiatric/Behavioral: Negative for sleep disturbance.         Objective:   BP (!) 141/81   Pulse 70   Resp 17   Ht 5' 4" (1.626 m)   Wt 57.1 kg (125 lb 14.1 oz)   BMI 21.61 kg/m²         Physical Exam    LOWER EXTREMITY PHYSICAL EXAMINATION  DERMATOLOGY: Skin is supple, dry and intact. No ecchymosis is noted. No hypertrophic skin formation. No erythema or cellulitis is noted.     VASCULAR: On the left foot, the dorsalis pedis pulse is 2/4 and the posterior tibial pulse is 2/4. Capillary refill time is less than 3 seconds. Hair growth is present on the dorsum of the foot and at the digits. No rubor is present. Proximal to distal temperature is warm to warm.    ORTHOPEDIC:  Persistent discomfort to palpation, though improved, posterior tibial tendon especially planus insertion.  Pes planus foot type is noted.  Pitting edema is noted, bilateral lower extremity.  Discomfort to palpation of the sinus tarsi.  Antalgic gait pattern is noted.    NEUROLOGY: Sensation to light touch is intact. " Proprioception is intact. Sensation to pin prick is intact.     Assessment:     1. Posterior tibial tendon dysfunction (PTTD) of left lower extremity    2. Pain in left ankle and joints of left foot    3. Acquired pes planus, left    4. Contracture, left ankle        Plan:     Posterior tibial tendon dysfunction (PTTD) of left lower extremity  -     ibuprofen (ADVIL,MOTRIN) 800 MG tablet; Take 1 tablet (800 mg total) by mouth 3 (three) times daily.  Dispense: 90 tablet; Refill: 1    Pain in left ankle and joints of left foot  -     ibuprofen (ADVIL,MOTRIN) 800 MG tablet; Take 1 tablet (800 mg total) by mouth 3 (three) times daily.  Dispense: 90 tablet; Refill: 1    Acquired pes planus, left    Contracture, left ankle        Thorough discussion is had with the patient today, concerning the diagnosis, its etiology, and the treatment algorithm at present.  Patient may return to work on tomorrow with regular shoe gear.  If she has persistent symptomology while working her shift, she should come home and put on the walking boot.  Motrin 800 mg 3 times daily going forward.  Follow up in approximately 2 weeks or so.  Did discuss proper and supportive shoe gear in detail and at length with the patient.  These are shoes with firm and robust arch support; medial counter.  Shoes which only bend at the metatarsophalangeal joint and which are rigid in the midfoot and hindfoot. Patient urged to purchase running type or cross training type shoes gear which are designed for pronation control.  Did discuss possible initiation of outpatient physical therapy.                 No future appointments.

## 2020-08-07 ENCOUNTER — OFFICE VISIT (OUTPATIENT)
Dept: FAMILY MEDICINE | Facility: CLINIC | Age: 45
End: 2020-08-07
Payer: COMMERCIAL

## 2020-08-07 VITALS
HEART RATE: 55 BPM | DIASTOLIC BLOOD PRESSURE: 77 MMHG | BODY MASS INDEX: 21.06 KG/M2 | TEMPERATURE: 99 F | SYSTOLIC BLOOD PRESSURE: 157 MMHG | WEIGHT: 123.38 LBS | HEIGHT: 64 IN

## 2020-08-07 DIAGNOSIS — M25.512 BILATERAL SHOULDER PAIN, UNSPECIFIED CHRONICITY: Primary | ICD-10-CM

## 2020-08-07 DIAGNOSIS — M25.511 BILATERAL SHOULDER PAIN, UNSPECIFIED CHRONICITY: Primary | ICD-10-CM

## 2020-08-07 PROCEDURE — 3008F BODY MASS INDEX DOCD: CPT | Mod: CPTII,S$GLB,, | Performed by: NURSE PRACTITIONER

## 2020-08-07 PROCEDURE — 99999 PR PBB SHADOW E&M-EST. PATIENT-LVL IV: ICD-10-PCS | Mod: PBBFAC,,, | Performed by: NURSE PRACTITIONER

## 2020-08-07 PROCEDURE — 99213 PR OFFICE/OUTPT VISIT, EST, LEVL III, 20-29 MIN: ICD-10-PCS | Mod: S$GLB,,, | Performed by: NURSE PRACTITIONER

## 2020-08-07 PROCEDURE — 3008F PR BODY MASS INDEX (BMI) DOCUMENTED: ICD-10-PCS | Mod: CPTII,S$GLB,, | Performed by: NURSE PRACTITIONER

## 2020-08-07 PROCEDURE — 99999 PR PBB SHADOW E&M-EST. PATIENT-LVL IV: CPT | Mod: PBBFAC,,, | Performed by: NURSE PRACTITIONER

## 2020-08-07 PROCEDURE — 99213 OFFICE O/P EST LOW 20 MIN: CPT | Mod: S$GLB,,, | Performed by: NURSE PRACTITIONER

## 2020-08-07 RX ORDER — CYCLOBENZAPRINE HCL 10 MG
10 TABLET ORAL 3 TIMES DAILY PRN
Qty: 30 TABLET | Refills: 0 | Status: SHIPPED | OUTPATIENT
Start: 2020-08-07 | End: 2020-10-16

## 2020-08-07 NOTE — PROGRESS NOTES
Subjective:       Patient ID: Pedro Pablo Bazan is a 44 y.o. female.    Chief Complaint: Shoulder Pain and Insomnia    Shoulder Pain   The pain is present in the right shoulder and left shoulder (Right worse than left). This is a chronic problem. The current episode started more than 1 month ago. There has been no history of extremity trauma. The problem occurs daily. The problem has been gradually worsening. The quality of the pain is described as aching. The pain is moderate. Associated symptoms include a limited range of motion (right) and stiffness. Pertinent negatives include no fever, headaches, inability to bear weight, itching, joint locking, joint swelling, numbness, tingling or visual symptoms. The symptoms are aggravated by activity. She has tried NSAIDS (Xrays 9/2020; showed degenerative changes) for the symptoms. The treatment provided mild relief. Family history does not include arthritis. There is no history of diabetes, Injuries to Extremity or migraines.   History reviewed. No pertinent past medical history.  Social History     Socioeconomic History    Marital status: Single     Spouse name: Not on file    Number of children: Not on file    Years of education: Not on file    Highest education level: Not on file   Occupational History    Not on file   Social Needs    Financial resource strain: Not on file    Food insecurity     Worry: Not on file     Inability: Not on file    Transportation needs     Medical: Not on file     Non-medical: Not on file   Tobacco Use    Smoking status: Never Smoker    Smokeless tobacco: Never Used   Substance and Sexual Activity    Alcohol use: Not on file    Drug use: Not on file    Sexual activity: Not on file   Lifestyle    Physical activity     Days per week: Not on file     Minutes per session: Not on file    Stress: Not on file   Relationships    Social connections     Talks on phone: Not on file     Gets together: Not on file     Attends  Restoration service: Not on file     Active member of club or organization: Not on file     Attends meetings of clubs or organizations: Not on file     Relationship status: Not on file   Other Topics Concern    Not on file   Social History Narrative    Not on file     History reviewed. No pertinent surgical history.    Review of Systems   Constitutional: Negative.  Negative for fever.   HENT: Negative.    Eyes: Negative.    Respiratory: Negative.    Cardiovascular: Negative.    Gastrointestinal: Negative.    Endocrine: Negative.    Genitourinary: Negative.    Musculoskeletal: Positive for arthralgias (bilateral shoulders) and stiffness.   Integumentary:  Negative for itching. Negative.   Allergic/Immunologic: Negative.    Neurological: Negative.  Negative for tingling, numbness and headaches.   Psychiatric/Behavioral: Negative.          Objective:      Physical Exam  Vitals signs and nursing note reviewed.   Constitutional:       Appearance: Normal appearance.   HENT:      Head: Normocephalic.      Right Ear: Tympanic membrane, ear canal and external ear normal.      Left Ear: Tympanic membrane, ear canal and external ear normal.      Nose: Nose normal.      Mouth/Throat:      Mouth: Mucous membranes are moist.      Pharynx: Oropharynx is clear.   Eyes:      Conjunctiva/sclera: Conjunctivae normal.      Pupils: Pupils are equal, round, and reactive to light.   Neck:      Musculoskeletal: Normal range of motion and neck supple.   Cardiovascular:      Rate and Rhythm: Normal rate and regular rhythm.      Pulses: Normal pulses.      Heart sounds: Normal heart sounds.   Pulmonary:      Effort: Pulmonary effort is normal.      Breath sounds: Normal breath sounds.   Abdominal:      General: Bowel sounds are normal.      Palpations: Abdomen is soft.   Musculoskeletal:      Right shoulder: She exhibits decreased range of motion and pain (with lifting). She exhibits no tenderness, no bony tenderness, no swelling, no  effusion, no crepitus, no deformity, no laceration, no spasm, normal pulse and normal strength.      Left shoulder: She exhibits pain (with lifting). She exhibits normal range of motion, no tenderness, no bony tenderness, no swelling, no effusion, no crepitus, no deformity, no laceration, no spasm, normal pulse and normal strength.   Skin:     General: Skin is warm and dry.      Capillary Refill: Capillary refill takes 2 to 3 seconds.   Neurological:      Mental Status: She is alert and oriented to person, place, and time.   Psychiatric:         Mood and Affect: Mood normal.         Behavior: Behavior normal.         Thought Content: Thought content normal.         Judgment: Judgment normal.         Assessment:       1. Bilateral shoulder pain, unspecified chronicity        Plan:           Pedro Pablo was seen today for shoulder pain.    Diagnoses and all orders for this visit:    Bilateral shoulder pain, unspecified chronicity  -     Ambulatory referral/consult to Orthopedics; Future  -     cyclobenzaprine (FLEXERIL) 10 MG tablet; Take 1 tablet (10 mg total) by mouth 3 (three) times daily as needed.  Biofreeze OTC as directed  Alternate heat and ice  Ibuprofen as prescribed  Report to ER immediately if symptoms worsen

## 2020-08-07 NOTE — PATIENT INSTRUCTIONS
Flexeril causes drowsiness  Biofreeze OTC as directed  Alternate heat and ice  Ibuprofen as prescribed  Report to ER immediately if symptoms worsen

## 2020-08-10 DIAGNOSIS — M25.511 BILATERAL SHOULDER PAIN, UNSPECIFIED CHRONICITY: Primary | ICD-10-CM

## 2020-08-10 DIAGNOSIS — M25.512 BILATERAL SHOULDER PAIN, UNSPECIFIED CHRONICITY: Primary | ICD-10-CM

## 2020-08-11 ENCOUNTER — PATIENT OUTREACH (OUTPATIENT)
Dept: ADMINISTRATIVE | Facility: OTHER | Age: 45
End: 2020-08-11

## 2020-08-11 NOTE — PROGRESS NOTES
LINKS Immunization: Patient not found  Health Maintenance: updated  Care Everywhere: updated  Chart reviewed for overdue Proactive [facility] Encounters health maintenance testing  Chart /Media/DIS searched: for outside mammogram results  Orders entered:  N/A  Portal message sent to patient with scheduling link for mammogram [phone]

## 2020-08-12 ENCOUNTER — OFFICE VISIT (OUTPATIENT)
Dept: ORTHOPEDICS | Facility: CLINIC | Age: 45
End: 2020-08-12
Payer: COMMERCIAL

## 2020-08-12 ENCOUNTER — HOSPITAL ENCOUNTER (OUTPATIENT)
Dept: RADIOLOGY | Facility: HOSPITAL | Age: 45
Discharge: HOME | End: 2020-08-12
Attending: ORTHOPAEDIC SURGERY
Payer: COMMERCIAL

## 2020-08-12 VITALS — RESPIRATION RATE: 18 BRPM | BODY MASS INDEX: 21 KG/M2 | HEIGHT: 64 IN | WEIGHT: 123 LBS

## 2020-08-12 DIAGNOSIS — M25.511 BILATERAL SHOULDER PAIN, UNSPECIFIED CHRONICITY: ICD-10-CM

## 2020-08-12 DIAGNOSIS — M25.512 BILATERAL SHOULDER PAIN, UNSPECIFIED CHRONICITY: ICD-10-CM

## 2020-08-12 PROCEDURE — 99999 PR PBB SHADOW E&M-EST. PATIENT-LVL III: ICD-10-PCS | Mod: PBBFAC,,, | Performed by: ORTHOPAEDIC SURGERY

## 2020-08-12 PROCEDURE — 73030 XR SHOULDER TRAUMA 3 VIEW BILATERAL: ICD-10-PCS | Mod: 26,,, | Performed by: RADIOLOGY

## 2020-08-12 PROCEDURE — 99243 PR OFFICE CONSULTATION,LEVEL III: ICD-10-PCS | Mod: S$GLB,,, | Performed by: ORTHOPAEDIC SURGERY

## 2020-08-12 PROCEDURE — 73030 X-RAY EXAM OF SHOULDER: CPT | Mod: TC,50,PO

## 2020-08-12 PROCEDURE — 73030 X-RAY EXAM OF SHOULDER: CPT | Mod: 26,,, | Performed by: RADIOLOGY

## 2020-08-12 PROCEDURE — 99243 OFF/OP CNSLTJ NEW/EST LOW 30: CPT | Mod: S$GLB,,, | Performed by: ORTHOPAEDIC SURGERY

## 2020-08-12 PROCEDURE — 99999 PR PBB SHADOW E&M-EST. PATIENT-LVL III: CPT | Mod: PBBFAC,,, | Performed by: ORTHOPAEDIC SURGERY

## 2020-08-12 RX ORDER — MELOXICAM 15 MG/1
15 TABLET ORAL DAILY
Qty: 30 TABLET | Refills: 2 | Status: SHIPPED | OUTPATIENT
Start: 2020-08-12 | End: 2020-08-30

## 2020-08-12 NOTE — LETTER
August 12, 2020      [place]  [address]  [place] [address]           [facility]  [address] [place] [address]  Phone: [phone]          Patient: Pedro Pablo Bazan   MR Number: [id number]   YOB: 1975   Date of Visit: 8/12/2020       Dear Samia Llamas:    Thank you for referring Pedro Pablo Bazan to me for evaluation. Attached you will find relevant portions of my assessment and plan of care.    If you have questions, please do not hesitate to call me. I look forward to following Pedro Pablo Bazan along with you.    Sincerely,    Shady Zuniga MD    Enclosure  CC:  No Recipients    If you would like to receive this communication electronically, please contact [email or web address] or [phone] to request more information on [employer] Link access.    For providers and/or their staff who would like to refer a patient to [facility], please contact us through our one-stop-shop provider referral line, [facility] , at [phone].    If you feel you have received this communication in error or would no longer like to receive these types of communications, please e-mail [email or web address]

## 2020-08-12 NOTE — PATIENT INSTRUCTIONS
"Shoulder Impingement/Rotator Cuff Tendinitis     One of the most common physical complaints is shoulder pain. Your shoulder is made up of several joints combined with tendons and muscles that allow a great range of motion in your arm. Because so many different structures make up the shoulder, it is vulnerable to many different problems. The rotator cuff is a frequent source of pain in the shoulder.    Anatomy   Your shoulder is made up of three bones: your upper arm bone (humerus), your shoulder blade (scapula), and your collarbone (clavicle).    Your arm is kept in your shoulder socket by your rotator cuff. These muscles and tendons form a covering around the head of your upper arm bone and attach it to your shoulder blade.    There is a lubricating sac called a bursa between the rotator cuff and the bone on top of your shoulder (acromion). The bursa allows the rotator cuff tendons to glide freely when you move your arm.    Normal anatomy of the shoulder.      Description   The rotator cuff is a common source of pain in the shoulder. Pain can be the result of:  Tendinitis. The rotator cuff tendons can be irritated or damaged.   Bursitis. The bursa can become inflamed and swell with more fluid causing pain.   Impingement. When you raise your arm to shoulder height, the space between the acromion and rotator cuff narrows. The acromion can rub against (or "impinge" on) the tendon and the bursa, causing irritation and pain.    The acromion "impinges" on the rotator cuff and bursa.     Cause   Rotator cuff pain is common in both young athletes and middle-aged people. Young athletes who use their arms overhead for swimming, baseball, and tennis are particularly vulnerable. Those who do repetitive lifting or overhead activities using the arm, such as paper hanging, construction, or painting are also susceptible.    Pain may also develop as the result of a minor injury. Sometimes, it occurs with no apparent cause.   " "  Symptoms   Rotator cuff pain commonly causes local swelling and tenderness in the front of the shoulder. You may have pain and stiffness when you lift your arm. There may also be pain when the arm is lowered from an elevated position.    Beginning symptoms may be mild. Patients frequently do not seek treatment at an early stage. These symptoms may include:  Minor pain that is present both with activity and at rest   Pain radiating from the front of the shoulder to the side of the arm   Sudden pain with lifting and reaching movements   Athletes in overhead sports may have pain when throwing or serving a tennis ball    As the problem progresses, the symptoms increase:  Pain at night   Loss of strength and motion   Difficulty doing activities that place the arm behind the back, such as buttoning or zippering    If the pain comes on suddenly, the shoulder may be severely tender. All movement may be limited and painful.     Doctor Examination   Medical History and Physical Examination    Your doctor will test your range of motion by having you move your arm in different directions.     After discussing your symptoms and medical history, your doctor will examine your shoulder. He or she will check to see whether it is tender in any area or whether there is a deformity. To measure the range of motion of your shoulder, your doctor will have you move your arm in several different directions. He or she will also test your arm strength.    Your doctor will check for other problems with your shoulder joint. He or she may also examine your neck to make sure that the pain is not coming from a "pinched nerve," and to rule out other conditions, such as arthritis.    Imaging Tests  Other tests which may help your doctor confirm your diagnosis include:    X-rays. Becauses x-rays do not show the soft tissues of your shoulder like the rotator cuff, plain x-rays of a shoulder with rotator cuff pain are usually normal or may show a " "small bone spur. A special x-ray view, called an "outlet view," sometimes will show a small bone spur on the front edge of the acromion.    (Left) Normal outlet view x-ray. (Right) Abnormal outlet view showing a large bone spur causing impingement on the rotator cuff.     Magnetic resonance imaging (MRI) and ultrasound. These studies can create better images of soft tissues like the rotator cuff tendons. They can show fluid or inflammation in the bursa and rotator cuff. In some cases, partial tearing of the rotator cuff will be seen.     Treatment   The goal of treatment is to reduce pain and restore function. In planning your treatment, your doctor will consider your age, activity level, and general health.    Nonsurgical Treatment  In most cases, initial treatment is nonsurgical. Although nonsurgical treatment may take several weeks to months, many patients experience a gradual improvement and return to function.    Rest. Your doctor may suggest rest and activity modification, such as avoiding overhead activities.    Non-steroidal anti-inflammatory medicines. Drugs like ibuprofen and naproxen reduce pain and swelling.    Physical therapy. A physical therapist will initially focus on restoring normal motion to your shoulder. Stretching exercises to improve range of motion are very helpful. If you have difficulty reaching behind your back, you may have developed tightness of the posterior capsule of the shoulder (capsule refers to the inner lining of the shoulder and posterior refers to the back of the shoulder). Specific stretching of the posterior capsule can be very effective in relieving pain in the shoulder.    Once your pain is improving, your therapist can start you on a strengthening program for the rotator cuff muscles.    Steroid injection. If rest, medications, and physical therapy do not relieve your pain, an injection of a local anesthetic and a cortisone preparation may be helpful. Cortisone is a very " effective anti-inflammatory medicine. Injecting it into the bursa beneath the acromion can relieve pain.    A cortisone injection may relieve painful symptoms.      Surgical Treatment  When nonsurgical treatment does not relieve pain, your doctor may recommend surgery.    The goal of surgery is to create more space for the rotator cuff. To do this, your doctor will remove the inflamed portion of the bursa. He or she may also perform an anterior acromioplasty, in which part of the acromion is removed. This is also known as a subacromial decompression. These procedures can be performed using either an arthroscopic or open technique.    Arthroscopic technique. In arthroscopy, thin surgical instruments are inserted into two or three small puncture wounds around your shoulder. Your doctor examines your shoulder through a fiberoptic scope connected to a television camera. He or she guides the small instruments using a video monitor, and removes bone and soft tissue. In most cases, the front edge of the acromion is removed along with some of the bursal tissue.    Your surgeon may also treat other conditions present in the shoulder at the time of surgery. These can include arthritis between the clavicle (collarbone) and the acromion (acromioclavicular arthritis), inflammation of the biceps tendon (biceps tendonitis), or a partial rotator cuff tear.    Open surgical technique. In open surgery, your doctor will make a small incision in the front of your shoulder. This allows your doctor to see the acromion and rotator cuff directly.    Anterior acromioplasty techniques. (Left) Arthroscopic repair. (Right) Open surgical procedure.     Rehabilitation. After surgery, your arm may be placed in a sling for a short period of time. This allows for early healing. As soon as your comfort allows, your doctor will remove the sling to begin exercise and use of the arm.    Your doctor will provide a rehabilitation program based on your  needs and the findings at surgery. This will include exercises to regain range of motion of the shoulder and strength of the arm. It typically takes 2 to 4 months to achieve complete relief of pain, but it may take up to a year.

## 2020-08-12 NOTE — PROGRESS NOTES
History reviewed. No pertinent past medical history.    History reviewed. No pertinent surgical history.    Current Outpatient Medications   Medication Sig    aspirin 81 MG Chew Take 81 mg by mouth once daily.    cyclobenzaprine (FLEXERIL) 10 MG tablet Take 1 tablet (10 mg total) by mouth 3 (three) times daily as needed.    ferrous sulfate (IRON, FERROUS SULFATE,) 325 mg (65 mg iron) Tab tablet Take 325 mg by mouth 2 (two) times daily.    ibuprofen (ADVIL,MOTRIN) 800 MG tablet Take 1 tablet (800 mg total) by mouth 3 (three) times daily.     No current facility-administered medications for this visit.        Review of patient's allergies indicates:  No Known Allergies    History reviewed. No pertinent family history.    Social History     Socioeconomic History    Marital status: Single     Spouse name: Not on file    Number of children: Not on file    Years of education: Not on file    Highest education level: Not on file   Occupational History    Not on file   Social Needs    Financial resource strain: Not on file    Food insecurity     Worry: Not on file     Inability: Not on file    Transportation needs     Medical: Not on file     Non-medical: Not on file   Tobacco Use    Smoking status: Never Smoker    Smokeless tobacco: Never Used   Substance and Sexual Activity    Alcohol use: Not on file    Drug use: Not on file    Sexual activity: Not on file   Lifestyle    Physical activity     Days per week: Not on file     Minutes per session: Not on file    Stress: Not on file   Relationships    Social connections     Talks on phone: Not on file     Gets together: Not on file     Attends [employer] service: Not on file     Active member of club or organization: Not on file     Attends meetings of clubs or organizations: Not on file     Relationship status: Not on file   Other Topics Concern    Not on file   Social History Narrative    Not on file       Chief Complaint:   Chief Complaint   Patient  presents with    Shoulder Pain     bilateral shoulder pain       History of present illness:  This is a 44-year-old female seen in consultation for Samia Turner.  Patient seen for bilateral shoulder pain.  Patient left-hand dominant.  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  It has been bothering her for several weeks now and getting worse.  Starting to affect her ability to sleep at night.      Review of Systems:    Constitution: Negative for chills, fever, and sweats.  Negative for unexplained weight loss.    HENT:  Negative for headaches and blurry vision.    Cardiovascular:Negative for chest pain or irregular heart beat. Negative for hypertension.    Respiratory:  Negative for cough and shortness of breath.    Gastrointestinal: Negative for abdominal pain, heartburn, melena, nausea, and vomitting.    Genitourinary:  Negative bladder incontinence and dysuria.    Musculoskeletal:  See HPI    Neurological: Negative for numbness.    Psychiatric/Behavioral: Negative for depression.  The patient is not nervous/anxious.      Endocrine: Negative for polyuria    Hematologic/Lymphatic: Negative for bleeding problem.  Does not bruise/bleed easily.    Skin: Negative for poor would healing and rash      Physical Examination:    Vital Signs:    Vitals:    08/12/20 1345   Resp: 18       Body mass index is 21.11 kg/m².    This a well-developed, well nourished patient in no acute distress.  They are alert and oriented and cooperative to examination.  Pt. walks without an antalgic gait.      Examination of the right shoulder shows no rashes or erythema. There are no masses, ecchymosis, or atrophy. The patient has full range of motion in forward flexion, external rotation, and internal rotation to the mid T-spine. The patient has moderately positive impingement signs. - [place]'s test. - Speeds test. Nontender to palpation over a.c. joint. Normal stability anteriorly, posteriorly, and negative sulcus  sign. Passive range of motion: Forward flexion of 180°, external rotation at 90° of 90°, internal rotation of 50°, and external rotation at 0° of 50°. 2+ radial pulse. Intact axillary, radial, median and ulnar sensation.  4+ out of 5 resisted forward flexion, external rotation, and negative lift off test.    Examination of the left shoulder shows no rashes or erythema. There are no masses, ecchymosis, or atrophy. The patient has full range of motion in forward flexion, external rotation, and internal rotation to the mid T-spine. The patient has - impingement signs. - [place]'s test. - Speeds test. Nontender to palpation over a.c. joint. Normal stability anteriorly, posteriorly, and negative sulcus sign. Passive range of motion: Forward flexion of 180°, external rotation at 90° of 90°, internal rotation of 50°, and external rotation at 0° of 50°. 2+ radial pulse. Intact axillary, radial, median and ulnar sensation. 5 out of 5 resisted forward flexion, external rotation, and negative lift off test.        X-rays:  X-rays of both shoulders are ordered and reviewed which show some mild to moderate glenohumeral arthritic change.  Also has some degenerative change around the greater tuberosity particularly on the right     Assessment::  Right rotator cuff syndrome with some mild underlying arthritis    Plan:  I reviewed the findings with her today.  I put her on Mobic 15 milligrams daily for at least a couple weeks.  Also gave her rotator cuff and shoulder conditioning guide as well as a Thera-Band.  I will see her back in 6 weeks to make sure she is getting better.  She mentioned a bone spur on her elbow that she might want resected.  X-rays of the left elbow at that time.    This note was created using [employer] voice recognition software that occasionally misinterpreted phrases or words.    Consult note is delivered via Epic messaging service.

## 2020-08-13 ENCOUNTER — TELEPHONE (OUTPATIENT)
Dept: FAMILY MEDICINE | Facility: CLINIC | Age: 45
End: 2020-08-13

## 2020-08-13 NOTE — TELEPHONE ENCOUNTER
Pt was requesting paperwork to be filled out from previous appt. Informed pt to bring papers for review by the provider.

## 2020-08-13 NOTE — TELEPHONE ENCOUNTER
----- Message from Naida Brizuela sent at 8/13/2020  9:19 AM CDT -----  Contact: RUDY  Would like to consult with nurse about some paper work please call back  [phone]

## 2020-08-14 ENCOUNTER — TELEPHONE (OUTPATIENT)
Dept: FAMILY MEDICINE | Facility: CLINIC | Age: 45
End: 2020-08-14

## 2020-08-14 NOTE — TELEPHONE ENCOUNTER
----- Message from Narcisa Ross sent at 8/14/2020 11:50 AM CDT -----  Contact: pt  The pt request a return call concerning her paperwork, no additional info given and can be reached at [phone]///[email or web address]

## 2020-08-18 ENCOUNTER — TELEPHONE (OUTPATIENT)
Dept: FAMILY MEDICINE | Facility: CLINIC | Age: 45
End: 2020-08-18

## 2020-08-18 NOTE — TELEPHONE ENCOUNTER
Please inform pt paperwork received and completed. Please make copy before returning to pt. Forms in nurse box.

## 2020-08-19 ENCOUNTER — TELEPHONE (OUTPATIENT)
Dept: FAMILY MEDICINE | Facility: CLINIC | Age: 45
End: 2020-08-19

## 2020-08-19 NOTE — TELEPHONE ENCOUNTER
----- Message from Erica Millard sent at 8/19/2020  1:11 PM CDT -----  Regarding: Paperwork  Pt is requesting call back in regards to picking up paperwork               Pls call back at [phone]

## 2020-08-19 NOTE — TELEPHONE ENCOUNTER
Pt is informed of paperwork complete and faxed to the number provider on the form.[employer] at  for .

## 2020-08-20 ENCOUNTER — OFFICE VISIT (OUTPATIENT)
Dept: FAMILY MEDICINE | Facility: CLINIC | Age: 45
End: 2020-08-20
Payer: COMMERCIAL

## 2020-08-20 ENCOUNTER — TELEPHONE (OUTPATIENT)
Dept: NEUROLOGY | Facility: CLINIC | Age: 45
End: 2020-08-20

## 2020-08-20 VITALS
SYSTOLIC BLOOD PRESSURE: 139 MMHG | TEMPERATURE: 98 F | HEIGHT: 64 IN | HEART RATE: 77 BPM | BODY MASS INDEX: 20.83 KG/M2 | WEIGHT: 122 LBS | DIASTOLIC BLOOD PRESSURE: 80 MMHG

## 2020-08-20 DIAGNOSIS — R29.6 FREQUENT FALLS: ICD-10-CM

## 2020-08-20 DIAGNOSIS — G71.00 MUSCULAR DYSTROPHY: Primary | ICD-10-CM

## 2020-08-20 PROCEDURE — 99213 PR OFFICE/OUTPT VISIT, EST, LEVL III, 20-29 MIN: ICD-10-PCS | Mod: S$GLB,,, | Performed by: NURSE PRACTITIONER

## 2020-08-20 PROCEDURE — 99213 OFFICE O/P EST LOW 20 MIN: CPT | Mod: S$GLB,,, | Performed by: NURSE PRACTITIONER

## 2020-08-20 PROCEDURE — 3008F BODY MASS INDEX DOCD: CPT | Mod: CPTII,S$GLB,, | Performed by: NURSE PRACTITIONER

## 2020-08-20 PROCEDURE — 99999 PR PBB SHADOW E&M-EST. PATIENT-LVL IV: ICD-10-PCS | Mod: PBBFAC,,, | Performed by: NURSE PRACTITIONER

## 2020-08-20 PROCEDURE — 3008F PR BODY MASS INDEX (BMI) DOCUMENTED: ICD-10-PCS | Mod: CPTII,S$GLB,, | Performed by: NURSE PRACTITIONER

## 2020-08-20 PROCEDURE — 99999 PR PBB SHADOW E&M-EST. PATIENT-LVL IV: CPT | Mod: PBBFAC,,, | Performed by: NURSE PRACTITIONER

## 2020-08-20 NOTE — PROGRESS NOTES
"Subjective:       Patient ID: Pedro Pablo Bazan is a 44 y.o. female.    Chief Complaint: Follow-up  Pt in today for follow-up paperwork for leave from her job. Pt dropped off paperwork this week; completed related to visit for shoulder pain on 8/7/2020 until 8/14/2020; seen by orthopedics on 8/12/2020. Pt in today requesting 10 additional weeks off in order to qualify for disability per pt and caregiver report. Pt states job manager informed her to take "whole 12 weeks off" and then have disability paperwork completed. Pt caregiver states, "she was had muscular dystrophy when she was little. She has been having frequent falls;" no mention of this at previous visit; not listed in history. Pt informed will extend excuse until seen by neurology. Pt has no other complaints today.   History reviewed. No pertinent past medical history.  Social History     Socioeconomic History    Marital status: Single     Spouse name: Not on file    Number of children: Not on file    Years of education: Not on file    Highest education level: Not on file   Occupational History    Not on file   Social Needs    Financial resource strain: Not on file    Food insecurity     Worry: Not on file     Inability: Not on file    Transportation needs     Medical: Not on file     Non-medical: Not on file   Tobacco Use    Smoking status: Never Smoker    Smokeless tobacco: Never Used   Substance and Sexual Activity    Alcohol use: Not on file    Drug use: Not on file    Sexual activity: Not on file   Lifestyle    Physical activity     Days per week: Not on file     Minutes per session: Not on file    Stress: Not on file   Relationships    Social connections     Talks on phone: Not on file     Gets together: Not on file     Attends [employer] service: Not on file     Active member of club or organization: Not on file     Attends meetings of clubs or organizations: Not on file     Relationship status: Not on file   Other Topics Concern "    Not on file   Social History Narrative    Not on file     History reviewed. No pertinent surgical history.    HPI  Review of Systems   Constitutional: Negative.    HENT: Negative.    Eyes: Negative.    Respiratory: Negative.    Cardiovascular: Negative.    Gastrointestinal: Negative.    Endocrine: Negative.    Genitourinary: Negative.    Musculoskeletal: Negative.    Integumentary:  Negative.   Allergic/Immunologic: Negative.    Neurological:        Frequent falls, muscular dystrophy per pt report   Psychiatric/Behavioral: Negative.          Objective:      Physical Exam  Vitals signs and nursing note reviewed.   Constitutional:       Appearance: Normal appearance.   HENT:      Head: Normocephalic.      Right Ear: Tympanic membrane, ear canal and external ear normal.      Left Ear: Tympanic membrane, ear canal and external ear normal.      Nose: Nose normal.      Mouth/Throat:      Mouth: Mucous membranes are moist.      Pharynx: Oropharynx is clear.   Eyes:      Conjunctiva/sclera: Conjunctivae normal.      Pupils: Pupils are equal, round, and reactive to light.   Neck:      Musculoskeletal: Normal range of motion and neck supple.   Cardiovascular:      Rate and Rhythm: Normal rate and regular rhythm.      Pulses: Normal pulses.      Heart sounds: Normal heart sounds.   Pulmonary:      Effort: Pulmonary effort is normal.      Breath sounds: Normal breath sounds.   Abdominal:      General: Bowel sounds are normal.      Palpations: Abdomen is soft.   Musculoskeletal: Normal range of motion.   Skin:     General: Skin is warm and dry.      Capillary Refill: Capillary refill takes 2 to 3 seconds.   Neurological:      Mental Status: She is alert and oriented to person, place, and time.   Psychiatric:         Mood and Affect: Mood normal.         Behavior: Behavior normal.         Thought Content: Thought content normal.         Judgment: Judgment normal.         Assessment:       1. Muscular dystrophy    2. Frequent  falls        Plan:           Pedro Pablo was seen today for follow-up.    Diagnoses and all orders for this visit:    Muscular dystrophy  Frequent falls  -     Ambulatory referral/consult to Neurology; Future

## 2020-08-20 NOTE — TELEPHONE ENCOUNTER
----- Message from Christa Ibarra LPN sent at 8/20/2020 10:11 AM CDT -----  Good morning, the provider wanted to know if you have any sooner appt available for this pt. Please and thank you.

## 2020-08-20 NOTE — LETTER
August 20, 2020      [facility]  [address] [address]  Phone: [phone]  Fax: [phone]       Patient: Pedro Pablo Bazan   YOB: 1975  Date of Visit: 08/20/2020    To Whom It May Concern:    Marylin Baazn  was at [facility] on 08/20/2020. She may return to work/school on 08/28/2020 with restrictions and pending clearance from Neurology. If you have any questions or concerns, or if I can be of further assistance, please do not hesitate to contact me.    Sincerely,    [facility]

## 2020-08-21 ENCOUNTER — TELEPHONE (OUTPATIENT)
Dept: NEUROLOGY | Facility: CLINIC | Age: 45
End: 2020-08-21

## 2020-08-21 NOTE — TELEPHONE ENCOUNTER
----- Message from Erica Ro MA sent at 8/20/2020  5:08 PM CDT -----    ----- Message -----  From: Christa Ibarra LPN  Sent: 8/20/2020  10:16 AM CDT  To: Elizabeth De La Garza    Good morning, the provider wanted to know if you have any sooner appt available for this pt. Please and thank you.

## 2020-08-28 ENCOUNTER — LAB VISIT (OUTPATIENT)
Dept: LAB | Facility: HOSPITAL | Age: 45
End: 2020-08-28
Attending: FAMILY MEDICINE
Payer: COMMERCIAL

## 2020-08-28 ENCOUNTER — OFFICE VISIT (OUTPATIENT)
Dept: FAMILY MEDICINE | Facility: CLINIC | Age: 45
End: 2020-08-28
Payer: COMMERCIAL

## 2020-08-28 VITALS
HEART RATE: 87 BPM | WEIGHT: 123 LBS | TEMPERATURE: 98 F | DIASTOLIC BLOOD PRESSURE: 88 MMHG | BODY MASS INDEX: 21 KG/M2 | SYSTOLIC BLOOD PRESSURE: 136 MMHG | HEIGHT: 64 IN

## 2020-08-28 DIAGNOSIS — D64.9 ANEMIA, UNSPECIFIED TYPE: ICD-10-CM

## 2020-08-28 DIAGNOSIS — Z76.89 REFERRAL OF PATIENT: ICD-10-CM

## 2020-08-28 DIAGNOSIS — G71.00 MUSCULAR DYSTROPHY: ICD-10-CM

## 2020-08-28 DIAGNOSIS — Z11.59 NEED FOR HEPATITIS C SCREENING TEST: ICD-10-CM

## 2020-08-28 DIAGNOSIS — Z13.220 ENCOUNTER FOR LIPID SCREENING FOR CARDIOVASCULAR DISEASE: ICD-10-CM
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
## 2020-08-28 DIAGNOSIS — Z12.31 ENCOUNTER FOR SCREENING MAMMOGRAM FOR BREAST CANCER: ICD-10-CM

## 2020-08-28 DIAGNOSIS — Z13.6 ENCOUNTER FOR LIPID SCREENING FOR CARDIOVASCULAR DISEASE: ICD-10-CM

## 2020-08-28 DIAGNOSIS — Z79.899 ENCOUNTER FOR LONG-TERM (CURRENT) USE OF MEDICATIONS: ICD-10-CM

## 2020-08-28 DIAGNOSIS — Z00.01 ENCOUNTER FOR GENERAL ADULT MEDICAL EXAMINATION WITH ABNORMAL FINDINGS: ICD-10-CM

## 2020-08-28 DIAGNOSIS — R53.1 WEAKNESS: ICD-10-CM

## 2020-08-28 DIAGNOSIS — R26.81 GAIT INSTABILITY: ICD-10-CM

## 2020-08-28 DIAGNOSIS — Z00.01 ENCOUNTER FOR GENERAL ADULT MEDICAL EXAMINATION WITH ABNORMAL FINDINGS: Primary | ICD-10-CM

## 2020-08-28 LAB
ALBUMIN SERPL BCP-MCNC: 4.2 G/DL (ref 3.5–5.2)
ALP SERPL-CCNC: 61 U/L (ref 55–135)
ALT SERPL W/O P-5'-P-CCNC: 56 U/L (ref 10–44)
ANION GAP SERPL CALC-SCNC: 10 MMOL/L (ref 8–16)
AST SERPL-CCNC: 43 U/L (ref 10–40)
BILIRUB SERPL-MCNC: 0.3 MG/DL (ref 0.1–1)
BUN SERPL-MCNC: 9 MG/DL (ref 6–20)
CALCIUM SERPL-MCNC: 9 MG/DL (ref 8.7–10.5)
CHLORIDE SERPL-SCNC: 105 MMOL/L (ref 95–110)
CHOLEST SERPL-MCNC: 196 MG/DL (ref 120–199)
CHOLEST/HDLC SERPL: 2.4 {RATIO} (ref 2–5)
CO2 SERPL-SCNC: 24 MMOL/L (ref 23–29)
CREAT SERPL-MCNC: 0.8 MG/DL (ref 0.5–1.4)
EST. GFR  (AFRICAN AMERICAN): >60 ML/MIN/1.73 M^2
EST. GFR  (NON AFRICAN AMERICAN): >60 ML/MIN/1.73 M^2
FERRITIN SERPL-MCNC: 11 NG/ML (ref 20–300)
GLUCOSE SERPL-MCNC: 85 MG/DL (ref 70–110)
HDLC SERPL-MCNC: 81 MG/DL (ref 40–75)
HDLC SERPL: 41.3 % (ref 20–50)
IRON SERPL-MCNC: 10 UG/DL (ref 30–160)
LDLC SERPL CALC-MCNC: 104 MG/DL (ref 63–159)
NONHDLC SERPL-MCNC: 115 MG/DL
POTASSIUM SERPL-SCNC: 4.7 MMOL/L (ref 3.5–5.1)
PROT SERPL-MCNC: 7.7 G/DL (ref 6–8.4)
SATURATED IRON: 2 % (ref 20–50)
SODIUM SERPL-SCNC: 139 MMOL/L (ref 136–145)
TOTAL IRON BINDING CAPACITY: 630 UG/DL (ref 250–450)
TRANSFERRIN SERPL-MCNC: 426 MG/DL (ref 200–375)
TRIGL SERPL-MCNC: 55 MG/DL (ref 30–150)
TSH SERPL DL<=0.005 MIU/L-ACNC: 1.21 UIU/ML (ref 0.4–4)

## 2020-08-28 PROCEDURE — 3008F PR BODY MASS INDEX (BMI) DOCUMENTED: ICD-10-PCS | Mod: CPTII,S$GLB,, | Performed by: FAMILY MEDICINE

## 2020-08-28 PROCEDURE — 99999 PR PBB SHADOW E&M-EST. PATIENT-LVL V: CPT | Mod: PBBFAC,,, | Performed by: FAMILY MEDICINE

## 2020-08-28 PROCEDURE — 99396 PREV VISIT EST AGE 40-64: CPT | Mod: S$GLB,,, | Performed by: FAMILY MEDICINE

## 2020-08-28 PROCEDURE — 99213 PR OFFICE/OUTPT VISIT, EST, LEVL III, 20-29 MIN: ICD-10-PCS | Mod: 25,S$GLB,, | Performed by: FAMILY MEDICINE

## 2020-08-28 PROCEDURE — 99396 PR PREVENTIVE VISIT,EST,40-64: ICD-10-PCS | Mod: S$GLB,,, | Performed by: FAMILY MEDICINE

## 2020-08-28 PROCEDURE — 3008F BODY MASS INDEX DOCD: CPT | Mod: CPTII,S$GLB,, | Performed by: FAMILY MEDICINE

## 2020-08-28 PROCEDURE — 99999 PR PBB SHADOW E&M-EST. PATIENT-LVL V: ICD-10-PCS | Mod: PBBFAC,,, | Performed by: FAMILY MEDICINE

## 2020-08-28 PROCEDURE — 80053 COMPREHEN METABOLIC PANEL: CPT

## 2020-08-28 PROCEDURE — 82728 ASSAY OF FERRITIN: CPT

## 2020-08-28 PROCEDURE — 36415 COLL VENOUS BLD VENIPUNCTURE: CPT | Mod: PO

## 2020-08-28 PROCEDURE — 83540 ASSAY OF IRON: CPT

## 2020-08-28 PROCEDURE — 80061 LIPID PANEL: CPT

## 2020-08-28 PROCEDURE — 84443 ASSAY THYROID STIM HORMONE: CPT

## 2020-08-28 PROCEDURE — 83036 HEMOGLOBIN GLYCOSYLATED A1C: CPT

## 2020-08-28 PROCEDURE — 86803 HEPATITIS C AB TEST: CPT

## 2020-08-28 PROCEDURE — 99213 OFFICE O/P EST LOW 20 MIN: CPT | Mod: 25,S$GLB,, | Performed by: FAMILY MEDICINE

## 2020-08-28 NOTE — PROGRESS NOTES
======================================================  GOAL of current visit: ESTABLISH CARE    Previous PCP: Dr. Mic Arvizu  Health Maintenance Due   Topic Date Due    Hepatitis C Screening  1975    HIV Screening  08/21/1989    TETANUS VACCINE  08/21/1992    Cervical Cancer Screening  08/21/1995    Mammogram  08/21/2014     ======================================================  PLAN:      Problem List Items Addressed This Visit     Muscular dystrophy (Chronic)     Sending of physical therapy, occupational medicine to evaluate the patient for work restrictions, neurology consult.         Relevant Orders    Ambulatory referral/consult to Neurology    Ambulatory referral/consult to Physical/Occupational Therapy    CANE FOR HOME USE    Anemia (Chronic)     Checking labs.  Set patient up with Hematology with chronic profound anemia.  Patient should also follow-up with OBGYN concerning her menstrual cycles.    ER precautions.  Stop anti-inflammatory medication until anemia improved.         Relevant Orders    Ferritin (Completed)    Iron and TIBC (Completed)    Encounter for long-term (current) use of medications (Chronic)     Complete history and physical was completed today.  Complete and thorough medication reconciliation was performed.  Discussed risks and benefits of medications.  Advised patient on orders and health maintenance.  We discussed old records and old labs if available.  Will request any records not available through epic.  Continue current medications listed on your summary sheet.           Relevant Orders    Mammo Digital Screening Bilat w/ Tevin    Comprehensive metabolic panel    Hemoglobin A1C    Hepatitis C Antibody    Lipid Panel    TSH    Gait instability     Start physical therapy, referral to occupational medicine for work restrictions.    Start using a cane to prevent falls.    Referral to Neurology is pending.    Fall precautions.  ER precautions.         Relevant Orders    TSH     Ambulatory referral/consult to Physical/Occupational Therapy    CANE FOR HOME USE    Weakness     Physical therapy, neurology consult.  Use assistive device when walking.  Fall precautions.         Relevant Orders    TSH    Ambulatory referral/consult to Physical/Occupational Therapy    CANE FOR HOME USE    Referral of patient     Referrals pending.         Relevant Orders    Ambulatory referral/consult to Occupational Medicine    Ambulatory referral/consult to Neurology    Ambulatory referral/consult to Physical/Occupational Therapy    CANE FOR HOME USE    Fall     Starting physical therapy, occupational medicine for work restrictions, patient advised to use walking assistive device at all times.  Fall precautions.         Relevant Orders    Ambulatory referral/consult to Occupational Medicine    Ambulatory referral/consult to Neurology    Ambulatory referral/consult to Physical/Occupational Therapy    CANE FOR HOME USE      Other Visit Diagnoses     Encounter for general adult medical examination with abnormal findings    -  Primary    Relevant Orders    Mammo Digital Screening Bilat w/ Tevin    Comprehensive metabolic panel    Hemoglobin A1C    Hepatitis C Antibody    Lipid Panel    TSH    Encounter for lipid screening for cardiovascular disease        Relevant Orders    Lipid Panel    Encounter for screening mammogram for breast cancer        Relevant Orders    Mammo Digital Screening Bilat w/ Tevin    Need for hepatitis C screening test        Relevant Orders    Hepatitis C Antibody        Future Appointments     Date Provider Specialty Appt Notes    8/31/2020 Rodo Neal, PT Outpatient Rehab Referral of patient [Z76.89]  Muscular dystrophy [G71.00]  Weakness [R53.1]  Gait instability [R26.81    9/23/2020 Shady Zuniga MD Orthopedics follow up bilateral shoulder pain    10/1/2020 Oneal Acosta MD Family Medicine 4 week f/u    11/23/2020 James Fu MD Neurology Muscular dystrophy  [G71.00]  Frequent falls [           Medication List with Changes/Refills   Current Medications    ASPIRIN 81 MG CHEW    Take 81 mg by mouth once daily.    FERROUS SULFATE (IRON, FERROUS SULFATE,) 325 MG (65 MG IRON) TAB TABLET    Take 325 mg by mouth 2 (two) times daily.   Discontinued Medications    MELOXICAM (MOBIC) 15 MG TABLET    Take 1 tablet (15 mg total) by mouth once daily.       Oneal Acosta M.D.     ==========================================================================  Subjective:      Patient ID: Pedro Pablo Bazan is a 45 y.o. female.  has a past medical history of Muscular dystrophy.     Chief Complaint: Establish Care and Referral (disability evaluation)      Problem List Items Addressed This Visit     Muscular dystrophy (Chronic)    Overview     Patient is new to me she reports that she has had muscular dystrophy since a child.  Patient has not had many issues and is been working up until this point.  Patient is starting to get weakness in her shoulders and hips.  She has had several falls in the last year.  She does not use a cane or walker.  Patient has not been to physical therapy.  Patient does not see neurology.         Current Assessment & Plan     Sending of physical therapy, occupational medicine to evaluate the patient for work restrictions, neurology consult.         Anemia (Chronic)    Overview     Chronic.  Patient with profound anemia.  Patient reports that she is taking iron in the past.  She denies any bloody stools or any other bleeding other than menstrual period.         Current Assessment & Plan     Checking labs.  Set patient up with Hematology with chronic profound anemia.  Patient should also follow-up with OBGYN concerning her menstrual cycles.    ER precautions.  Stop anti-inflammatory medication until anemia improved.         Encounter for long-term (current) use of medications (Chronic)    Overview     CHRONIC. Stable. Compliant with medications for managed  conditions. See medication list. No SE reported.   Routine lab analysis is being monitored. Refills were addressed.  Lab Results   Component Value Date    WBC 5.30 09/21/2016    HGB 9.0 (L) 09/21/2016    HCT 31.2 (L) 09/21/2016    MCV 65 (L) 09/21/2016     (H) 09/21/2016         Chemistry        Component Value Date/Time     08/28/2020 1205    K 4.7 08/28/2020 1205     08/28/2020 1205    CO2 24 08/28/2020 1205    BUN 9 08/28/2020 1205    CREATININE 0.8 08/28/2020 1205    GLU 85 08/28/2020 1205        Component Value Date/Time    CALCIUM 9.0 08/28/2020 1205    ALKPHOS 61 08/28/2020 1205    AST 43 (H) 08/28/2020 1205    ALT 56 (H) 08/28/2020 1205    BILITOT 0.3 08/28/2020 1205    ESTGFRAFRICA >60.0 08/28/2020 1205    EGFRNONAA >60.0 08/28/2020 1205          Lab Results   Component Value Date    TSH 1.206 08/28/2020              Current Assessment & Plan     Complete history and physical was completed today.  Complete and thorough medication reconciliation was performed.  Discussed risks and benefits of medications.  Advised patient on orders and health maintenance.  We discussed old records and old labs if available.  Will request any records not available through epic.  Continue current medications listed on your summary sheet.           Gait instability    Overview     Patient is here to establish care with me today.  Patient reports that she has a history of muscular dystrophy, but there is no confirmation of this in the medical records.  Patient does report that she has been weak in her shoulders and hips in that she has been falling more recently.  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  Patient has not been using a cane.  She has not been physical therapy.  No imaging on the lower back or lower extremities.         Current Assessment & Plan     Start physical therapy, referral to occupational medicine for work restrictions.    Start using a cane to prevent falls.    Referral to  Neurology is pending.    Fall precautions.  ER precautions.         Weakness    Overview     Likely due to muscular dystrophy.  Referral to neurology is pending         Current Assessment & Plan     Physical therapy, neurology consult.  Use assistive device when walking.  Fall precautions.         Referral of patient    Overview     Patient is in need of multiple referrals.  Setting these up today.         Current Assessment & Plan     Referrals pending.         Fall    Overview     Acute on chronic.  Recurrent.  Patient reports that she has fallen recently within the last few weeks.  Denies hitting her head or loss of consciousness.  Patient reports that her leg tend to go out on her randomly.         Current Assessment & Plan     Starting physical therapy, occupational medicine for work restrictions, patient advised to use walking assistive device at all times.  Fall precautions.           Other Visit Diagnoses     Encounter for general adult medical examination with abnormal findings    -  Primary    Encounter for lipid screening for cardiovascular disease        Encounter for screening mammogram for breast cancer        Need for hepatitis C screening test               Past Medical History:  Past Medical History:   Diagnosis Date    Muscular dystrophy     per pt caregiver report at visit on 8/20/2020     History reviewed. No pertinent surgical history.  Review of patient's allergies indicates:  No Known Allergies  Medication List with Changes/Refills   Current Medications    ASPIRIN 81 MG CHEW    Take 81 mg by mouth once daily.    FERROUS SULFATE (IRON, FERROUS SULFATE,) 325 MG (65 MG IRON) TAB TABLET    Take 325 mg by mouth 2 (two) times daily.   Discontinued Medications    MELOXICAM (MOBIC) 15 MG TABLET    Take 1 tablet (15 mg total) by mouth once daily.      Social History     Tobacco Use    Smoking status: Never Smoker    Smokeless tobacco: Never Used   Substance Use Topics    Alcohol use: Never      "Frequency: Never      History reviewed. No pertinent family history.    I have reviewed the complete PMH, social history, surgical history, allergies and medications.  As well as family history.    Review of Systems   Constitutional: Negative for activity change and fatigue.   HENT: Negative for congestion and sinus pain.    Eyes: Negative for visual disturbance.   Respiratory: Negative for chest tightness and shortness of breath.    Cardiovascular: Negative for palpitations and leg swelling.   Gastrointestinal: Negative for abdominal pain, diarrhea and nausea.   Endocrine: Negative for polyuria.   Genitourinary: Negative for difficulty urinating and frequency.   Musculoskeletal: Positive for arthralgias and gait problem. Negative for joint swelling.   Skin: Negative for rash.   Neurological: Negative for dizziness and headaches.   Psychiatric/Behavioral: Negative for agitation. The patient is not nervous/anxious.      Objective:   /88 Comment: manual large cuff to left arm  Pulse 87   Temp 98.2 °F (36.8 °C) (Temporal)   Ht 5' 4" (1.626 m)   Wt 55.8 kg (123 lb)   LMP 08/19/2020   BMI 21.11 kg/m²   Physical Exam  Vitals signs and nursing note reviewed.   Constitutional:       General: She is not in acute distress.     Appearance: She is well-developed. She is not ill-appearing, toxic-appearing or diaphoretic.   HENT:      Head: Normocephalic and atraumatic.      Right Ear: Hearing and external ear normal.      Left Ear: Hearing and external ear normal.   Eyes:      General: Lids are normal. No visual field deficit.     Conjunctiva/sclera: Conjunctivae normal.      Pupils: Pupils are equal, round, and reactive to light.   Neck:      Musculoskeletal: Normal range of motion and neck supple.   Cardiovascular:      Rate and Rhythm: Normal rate and regular rhythm.      Heart sounds: Normal heart sounds. No murmur.   Pulmonary:      Effort: Pulmonary effort is normal. No respiratory distress.      Breath sounds: " Normal breath sounds. No wheezing.   Abdominal:      General: Bowel sounds are normal.      Palpations: Abdomen is soft.   Musculoskeletal: Normal range of motion.         General: Tenderness and deformity present.   Skin:     General: Skin is warm and dry.      Capillary Refill: Capillary refill takes less than 2 seconds.      Coloration: Skin is not pale.   Neurological:      General: No focal deficit present.      Mental Status: She is alert and oriented to person, place, and time. She is not disoriented.      GCS: GCS eye subscore is 4. GCS verbal subscore is 5. GCS motor subscore is 6.      Cranial Nerves: No cranial nerve deficit, dysarthria or facial asymmetry.      Sensory: Sensation is intact. No sensory deficit.      Motor: Weakness present. No tremor, atrophy or seizure activity.      Coordination: Coordination is intact.      Gait: Gait is intact.      Comments: Patient with bilateral upper extremity weakness in the shoulders and hips bilaterally.   Psychiatric:         Attention and Perception: Attention normal. She is attentive.         Mood and Affect: Mood normal. Mood is not anxious or depressed.         Speech: Speech is not rapid and pressured or slurred.         Behavior: Behavior normal. Behavior is not agitated, aggressive or hyperactive. Behavior is cooperative.         Thought Content: Thought content normal. Thought content is not paranoid or delusional. Thought content does not include homicidal or suicidal ideation. Thought content does not include homicidal or suicidal plan.         Cognition and Memory: Memory is not impaired.         Judgment: Judgment normal.         Assessment:     1. Encounter for general adult medical examination with abnormal findings    2. Encounter for long-term (current) use of medications    3. Encounter for lipid screening for cardiovascular disease    4. Encounter for screening mammogram for breast cancer    5. Need for hepatitis C screening test    6.  "Referral of patient    7. Anemia, unspecified type    8. Muscular dystrophy    9. Weakness    10. Gait instability    11. Fall, subsequent encounter      MDM:   Moderate complexity.  High risk.  Patient is here for annual wellness visit.  This noted specifically for chronic conditions and other problems.  I have Reviewed and summarized old records.  I have performed thorough medication reconciliation today and discussed risk and benefits of each medication.  I have reviewed labs and discussed with patient.  All questions were answered.  I am requesting old records and will review them once they are available.    I have signed for the following orders AND/OR meds.  Orders Placed This Encounter   Procedures    CANE FOR HOME USE     Order Specific Question:   Type of Cane:     Answer:   Straight     Order Specific Question:   Height:     Answer:   5' 4" (1.626 m)     Order Specific Question:   Weight:     Answer:   55.8 kg (123 lb)     Order Specific Question:   Length of need (1-99 months):     Answer:   99     Order Specific Question:   Please check all that apply:     Answer:   Patient's condition impairs ambulation.     Order Specific Question:   Please check all that apply:     Answer:   Patient is unable to safely ambulate without equipment.     Order Specific Question:   Please check all that apply:     Answer:   Patient needs help to get in and out of chair.    Mammo Digital Screening Bilat w/ Tevin     Standing Status:   Future     Standing Expiration Date:   10/28/2021     Order Specific Question:   May the Radiologist modify the order per protocol to meet the clinical needs of the patient?     Answer:   Yes    Comprehensive metabolic panel     Standing Status:   Standing     Number of Occurrences:   99     Standing Expiration Date:   10/27/2021    Hemoglobin A1C     Standing Status:   Standing     Number of Occurrences:   99     Standing Expiration Date:   10/28/2021    Hepatitis C Antibody     Standing " Status:   Future     Number of Occurrences:   1     Standing Expiration Date:   10/28/2021    Lipid Panel     Standing Status:   Standing     Number of Occurrences:   99     Standing Expiration Date:   10/28/2021    TSH     Standing Status:   Standing     Number of Occurrences:   99     Standing Expiration Date:   10/28/2021    Ferritin     Standing Status:   Future     Number of Occurrences:   1     Standing Expiration Date:   10/27/2021    Iron and TIBC     Standing Status:   Future     Number of Occurrences:   1     Standing Expiration Date:   10/27/2021    Ambulatory referral/consult to Occupational Medicine     Standing Status:   Future     Standing Expiration Date:   9/28/2021     Referral Priority:   Routine     Referral Type:   Occupational Medicine     Referral Reason:   Specialty Services Required     Requested Specialty:   Occupational Medicine     Number of Visits Requested:   1    Ambulatory referral/consult to Neurology     Standing Status:   Future     Standing Expiration Date:   9/28/2021     Referral Priority:   Urgent     Referral Type:   Consultation     Referral Reason:   Specialty Services Required     Referred to Provider:   Johana Tsang MD     Requested Specialty:   Neurology     Number of Visits Requested:   1    Ambulatory referral/consult to Physical/Occupational Therapy     Standing Status:   Future     Standing Expiration Date:   9/28/2021     Referral Priority:   Routine     Referral Type:   Physical Medicine     Referral Reason:   Specialty Services Required     Requested Specialty:   Physical Therapy     Number of Visits Requested:   1           Follow up in about 1 year (around 8/28/2021), or if symptoms worsen or fail to improve, for Annual Wellness Exam.    If no improvement in symptoms or symptoms worsen, advised to call/follow-up at clinic or go to ER. Patient voiced understanding and all questions/concerns were addressed.     DISCLAIMER: This note was compiled by  using a speech recognition dictation system and therefore please be aware that typographical / speech recognition errors can and do occur.  Please contact me if you see any errors specifically.    Oneal Acosta M.D.       Office: [phone] [address] [place] [address]  FAX: [phone]

## 2020-08-28 NOTE — PATIENT INSTRUCTIONS
Follow up in about 1 year (around 8/28/2021), or if symptoms worsen or fail to improve, for Annual Wellness Exam.     If no improvement in symptoms or symptoms worsen, please be advised to call MD, follow-up at clinic and/or go to ER if becomes severe.    Oneal Acosta M.D.        We Offer TELEHEALTH & Same Day Appointments!   Book your Telehealth appointment with me through my nurse or   Clinic appointments on [employer]!    [address] [place] [address]    Office: [phone]   FAX: [phone]    Check out my Facebook Page and Follow Me at: [email or web address]    Check out my website at [employer] by clicking on: [email or web address]    To Schedule appointments online, go to [email or web address]: [email or web address]

## 2020-08-29 DIAGNOSIS — R74.8 ELEVATED LIVER ENZYMES: ICD-10-CM

## 2020-08-29 DIAGNOSIS — D64.9 ANEMIA, UNSPECIFIED TYPE: Primary | ICD-10-CM

## 2020-08-29 LAB
ESTIMATED AVG GLUCOSE: 91 MG/DL (ref 68–131)
HBA1C MFR BLD HPLC: 4.8 % (ref 4–5.6)

## 2020-08-29 NOTE — PROGRESS NOTES
#CALL THE PATIENT# to discuss results/see if they have questions and document verification. Make FU appt if needed.    #Pend me the orders in my interpretation below.#    I have sent a message to them with the interpretation (see below).  See if they have any questions and make a follow-up appointment if not already schedule and if needed.    Also please address any outstanding health maintenance that may be due: Hepatitis C Screening due on 1975  HIV Screening due on 08/21/1989  TETANUS VACCINE due on 08/21/1992  Cervical Cancer Screening due on 08/21/1995  Mammogram due on 08/21/2014  ====================================    My interpretation that was sent to them through [employer]:    Pedro Pablo, I have reviewed your recent blood work.     Your complete blood count is ABNORMAL WITH PROFOUND IRON DEFICIENCY ANEMIA..  I AM GOING TO SET YOU UP WITH A HEMATOLOGIST TO START IRON INFUSIONS IF NEEDED.  AND FOR FURTHER EVALUATION.  Your metabolic panel which shows your glucose, kidney function, electrolytes, and liver function is ABNORMAL WITH ELEVATED LIVER ENZYMES.  FURTHER EVALUATION IS NEEDED..   Thyroid studies are NORMAL.   Your cholesterol is NORMAL.    Your hemoglobin A1c is NORMAL.  This test is gold standard screening test for diabetes.  It is a measures 3 months of your average blood sugar.    PLEASE FOLLOW-UP WITH ME SO WE CAN DISCUSS THESE LAB ABNORMALITIES IN DETAIL.    SET UP APPOINTMENT WITH HEMATOLOGY.  PATIENT NEEDS ULTRASOUND OF THE LIVER.    REPEAT LIVER ENZYMES IN FOUR WEEKS.

## 2020-08-30 PROBLEM — R26.81 GAIT INSTABILITY: Status: ACTIVE | Noted: 2020-08-30
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
## 2020-08-30 PROBLEM — R53.1 WEAKNESS: Status: ACTIVE | Noted: 2020-08-30

## 2020-08-30 PROBLEM — D64.9 ANEMIA: Chronic | Status: ACTIVE | Noted: 2020-08-30

## 2020-08-30 PROBLEM — D64.9 ANEMIA: Status: ACTIVE | Noted: 2020-08-30

## 2020-08-30 PROBLEM — G71.00 MUSCULAR DYSTROPHY: Status: ACTIVE | Noted: 2020-08-30

## 2020-08-30 PROBLEM — Z79.899 ENCOUNTER FOR LONG-TERM (CURRENT) USE OF MEDICATIONS: Chronic | Status: ACTIVE | Noted: 2020-08-30

## 2020-08-30 PROBLEM — Z79.899 ENCOUNTER FOR LONG-TERM (CURRENT) USE OF MEDICATIONS: Status: ACTIVE | Noted: 2020-08-30

## 2020-08-30 PROBLEM — G71.00 MUSCULAR DYSTROPHY: Chronic | Status: ACTIVE | Noted: 2020-08-30

## 2020-08-30 PROBLEM — Z76.89 REFERRAL OF PATIENT: Status: ACTIVE | Noted: 2020-08-30

## 2020-08-31 ENCOUNTER — CLINICAL SUPPORT (OUTPATIENT)
Dept: REHABILITATION | Facility: HOSPITAL | Age: 45
End: 2020-08-31
Attending: FAMILY MEDICINE
Payer: COMMERCIAL

## 2020-08-31 ENCOUNTER — TELEPHONE (OUTPATIENT)
Dept: FAMILY MEDICINE | Facility: CLINIC | Age: 45
End: 2020-08-31

## 2020-08-31 DIAGNOSIS — R26.89 BALANCE PROBLEM: ICD-10-CM

## 2020-08-31 DIAGNOSIS — R26.81 GAIT INSTABILITY: ICD-10-CM

## 2020-08-31 DIAGNOSIS — G71.00 MUSCULAR DYSTROPHY: ICD-10-CM
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
## 2020-08-31 DIAGNOSIS — R53.1 WEAKNESS: ICD-10-CM

## 2020-08-31 DIAGNOSIS — Z76.89 REFERRAL OF PATIENT: ICD-10-CM

## 2020-08-31 DIAGNOSIS — R29.898 DECREASED STRENGTH OF LOWER EXTREMITY: ICD-10-CM

## 2020-08-31 DIAGNOSIS — M25.60 LIMITED JOINT RANGE OF MOTION (ROM): ICD-10-CM

## 2020-08-31 LAB — HCV AB SERPL QL IA: NEGATIVE

## 2020-08-31 PROCEDURE — 97110 THERAPEUTIC EXERCISES: CPT | Mod: PN

## 2020-08-31 PROCEDURE — 97162 PT EVAL MOD COMPLEX 30 MIN: CPT | Mod: PN

## 2020-08-31 NOTE — PROGRESS NOTES
This note is specifically for wellness visit performed today.     WELLNESS EXAM      Patient ID: Pedro Pablo Bazan is a 45 y.o. female with  has a past medical history of Muscular dystrophy.     Chief Complaint:  Encounter for wellness exam    Well Adult Physical: Patient here for a comprehensive physical exam.The patient reports multiple chronic and other problems.  See other note for these conditions.  Do you take any herbs or supplements that were not prescribed by a doctor? no   Are you taking calcium supplements? no   Are you taking aspirin daily?  Yes     History:  Heavy periods  OBGYN:  Requesting records    LMP: Patient's last menstrual period was 08/19/2020.     MMG:  Patient is due  PAP:  Patient is due  Colonoscopy:  Not indicated    Health Maintenance Topics with due status: Not Due       Topic Last Completion Date    Lipid Panel 08/28/2020    Influenza Vaccine Not Due        ==============================================  History reviewed.   Health Maintenance Due   Topic Date Due    Hepatitis C Screening  1975    HIV Screening  08/21/1989    TETANUS VACCINE  08/21/1992    Cervical Cancer Screening  08/21/1995    Mammogram  08/21/2014       Past Medical History:  Past Medical History:   Diagnosis Date    Muscular dystrophy     per pt caregiver report at visit on 8/20/2020     History reviewed. No pertinent surgical history.  Review of patient's allergies indicates:  No Known Allergies  Current Outpatient Medications on File Prior to Visit   Medication Sig Dispense Refill    aspirin 81 MG Chew Take 81 mg by mouth once daily.      ferrous sulfate (IRON, FERROUS SULFATE,) 325 mg (65 mg iron) Tab tablet Take 325 mg by mouth 2 (two) times daily.      [DISCONTINUED] meloxicam (MOBIC) 15 MG tablet Take 1 tablet (15 mg total) by mouth once daily. 30 tablet 2     No current facility-administered medications on file prior to visit.      Social History     Socioeconomic History    Marital  status: Single     Spouse name: Not on file    Number of children: Not on file    Years of education: Not on file    Highest education level: Not on file   Occupational History    Not on file   Social Needs    Financial resource strain: Not very hard    Food insecurity     Worry: Never true     Inability: Never true    Transportation needs     Medical: No     Non-medical: No   Tobacco Use    Smoking status: Never Smoker    Smokeless tobacco: Never Used   Substance and Sexual Activity    Alcohol use: Never     Frequency: Never    Drug use: Never    Sexual activity: Not Currently   Lifestyle    Physical activity     Days per week: Not on file     Minutes per session: Not on file    Stress: Only a little   Relationships    Social connections     Talks on phone: More than three times a week     Gets together: More than three times a week     Attends Christian service: More than 4 times per year     Active member of club or organization: No     Attends meetings of clubs or organizations: Never     Relationship status: Never    Other Topics Concern    Not on file   Social History Narrative    Not on file     History reviewed. No pertinent family history.    Review of Systems   See other note for review of systems.  Otherwise negative  Objective:    Nursing note and vitals reviewed.  Vitals:    08/28/20 1118   BP: 136/88   Pulse:    Temp:      Body mass index is 21.11 kg/m².     Physical Exam  Vitals signs and nursing note reviewed.   Constitutional:       General: She is not in acute distress.     Appearance: She is well-developed.   HENT:      Head: Normocephalic and atraumatic.   Eyes:      Pupils: Pupils are equal, round, and reactive to light.   Neck:      Musculoskeletal: Normal range of motion and neck supple.   Cardiovascular:      Rate and Rhythm: Normal rate and regular rhythm.      Heart sounds: Normal heart sounds. No murmur.   Pulmonary:      Effort: Pulmonary effort is normal. No  "respiratory distress.      Breath sounds: Normal breath sounds. No wheezing.   Musculoskeletal: Normal range of motion.   Skin:     General: Skin is warm and dry.      Capillary Refill: Capillary refill takes less than 2 seconds.   Neurological:      Mental Status: She is alert and oriented to person, place, and time.      Cranial Nerves: No cranial nerve deficit.   Psychiatric:         Behavior: Behavior normal.             Assessment / Plan:      1.  ANNUAL WELLNESS EXAM -patient here for annual wellness exam.  Labs ordered.  Health maintenance was reviewed and ordered.  Medications were reviewed and reconciled.    Complete history and physical was completed today.  Complete and thorough medication reconciliation was performed.  Discussed risks and benefits of medications.  Advised patient on orders and health maintenance.  We discussed old records and old labs if available.  Will request any records not available through epic.  Continue current medications listed on your summary sheet.    All questions were answered. Patient had no further concerns. Advised of Wellness plan. Follow up in 1 year for ANNUAL WELLNESS EXAM    Orders Placed This Encounter   Procedures    CANE FOR HOME USE     Order Specific Question:   Type of Cane:     Answer:   Straight     Order Specific Question:   Height:     Answer:   5' 4" (1.626 m)     Order Specific Question:   Weight:     Answer:   55.8 kg (123 lb)     Order Specific Question:   Length of need (1-99 months):     Answer:   99     Order Specific Question:   Please check all that apply:     Answer:   Patient's condition impairs ambulation.     Order Specific Question:   Please check all that apply:     Answer:   Patient is unable to safely ambulate without equipment.     Order Specific Question:   Please check all that apply:     Answer:   Patient needs help to get in and out of chair.    Mammo Digital Screening Bilat w/ Tevin     Standing Status:   Future     Standing " Expiration Date:   10/28/2021     Order Specific Question:   May the Radiologist modify the order per protocol to meet the clinical needs of the patient?     Answer:   Yes    Comprehensive metabolic panel     Standing Status:   Standing     Number of Occurrences:   99     Standing Expiration Date:   10/27/2021    Hemoglobin A1C     Standing Status:   Standing     Number of Occurrences:   99     Standing Expiration Date:   10/28/2021    Hepatitis C Antibody     Standing Status:   Future     Number of Occurrences:   1     Standing Expiration Date:   10/28/2021    Lipid Panel     Standing Status:   Standing     Number of Occurrences:   99     Standing Expiration Date:   10/28/2021    TSH     Standing Status:   Standing     Number of Occurrences:   99     Standing Expiration Date:   10/28/2021    Ferritin     Standing Status:   Future     Number of Occurrences:   1     Standing Expiration Date:   10/27/2021    Iron and TIBC     Standing Status:   Future     Number of Occurrences:   1     Standing Expiration Date:   10/27/2021    Ambulatory referral/consult to Occupational Medicine     Standing Status:   Future     Standing Expiration Date:   9/28/2021     Referral Priority:   Routine     Referral Type:   Occupational Medicine     Referral Reason:   Specialty Services Required     Requested Specialty:   Occupational Medicine     Number of Visits Requested:   1    Ambulatory referral/consult to Neurology     Standing Status:   Future     Standing Expiration Date:   9/28/2021     Referral Priority:   Urgent     Referral Type:   Consultation     Referral Reason:   Specialty Services Required     Referred to Provider:   Johana Tsang MD     Requested Specialty:   Neurology     Number of Visits Requested:   1    Ambulatory referral/consult to Physical/Occupational Therapy     Standing Status:   Future     Standing Expiration Date:   9/28/2021     Referral Priority:   Routine     Referral Type:   Physical  Medicine     Referral Reason:   Specialty Services Required     Requested Specialty:   Physical Therapy     Number of Visits Requested:   1               Oneal Acosta MD

## 2020-08-31 NOTE — PROGRESS NOTES
"[facility]  Physical Therapy Initial Evaluation    Name: Pedro Pablo Bazan  Clinic Number: [id number]    Therapy Diagnosis: No diagnosis found.  Physician: Oneal Acosta MD    Physician Orders: PT Eval and Treat  Medical Diagnosis from Referral: ***  Evaluation Date: 8/31/2020  Authorization Period Expiration: ***  Plan of Care Expiration: ***  Visit # / Visits authorized: ***/ ***  FOTO: 1/10    Gcode: 1/10  Visit:  ***  Total: ***    Time In: ***  Time Out: ***  Total Billable Time: *** minutes (*** Complexity Evaluation, Therapeutic Exercise - ***, Manual Therapy - ***)    Precautions: Standard, {IP WOUND PRECAUTIONS OHS:77479}    Subjective   Date of onset: ***  History of current condition - Pedro Pablo reports: ***     Medical History:   Past Medical History:   Diagnosis Date    Muscular dystrophy     per pt caregiver report at visit on 8/20/2020       Surgical History:   Pedro Pablo Bazan  has no past surgical history on file.    Medications:   Pedro Pablo has a current medication list which includes the following prescription(s): aspirin and ferrous sulfate.    Allergies:   Review of patient's allergies indicates:  No Known Allergies     Imaging, none:     Prior Therapy: none  Social History: *** {LIVES WITH:81219}  Occupation: ***  Prior Level of Function: ***  Current Level of Function: ***    Pain:   Current {0-10:84436::"0"}/10, worst {0-10:20507::"0"}/10, best {0-10:27862::"0"}/10   Location: {RIGHT LEFT BILATERAL:37428} {LOCATION ON BODY:75913}  Description: {Pain Description:65338}  Aggravating Factors: {Causes; Pain:07183}  Easing Factors: {Pain (activities that relieve):23330}    Pts goals: ***    Objective     Posture: ***  Palpation: ***  Sensation: ***  DTRs: ***  Range of Motion/Strength:   CERVICAL AROM Pain/Dysfunction with Movement   Flexion ***    Extension ***    Right side bending ***    Left side bending ***    Right rotation ***    Left rotation ***  "     Thoracolumbar AROM Pain/Dysfunction with Movement   Flexion ***    Extension ***    Right side bending ***    Left side bending ***    Right rotation ***    Left rotation ***      Shoulder Right Left Pain/Dysfunction with Movement   AROM/PROM      flexion  ***/***  ***/***    extension  ***/***  ***/***    abduction  ***/***  ***/***    adduction  ***/***  ***/***    Internal rotation  ***/***  ***/***    ER at 90° abd  ***/***  ***/***    ER at 0° abd  ***/***  ***/***      Elbow Right Left Pain/Dysfunction with Movement   AROM/PROM      flexion  ***/***  ***/***    extension  ***/***  ***/***      U/E MMT Right Left Pain/Dysfunction with Movement   Shoulder Flexion {AMB PT VESTIBULAR STRENGTH:73484} {AMB PT VESTIBULAR STRENGTH:96478}    Shoulder Extension {AMB PT VESTIBULAR STRENGTH:42746} {AMB PT VESTIBULAR STRENGTH:53887}    Shoulder Abduction {AMB PT VESTIBULAR STRENGTH:54366} {AMB PT VESTIBULAR STRENGTH:88968}    Shoulder Adduction {AMB PT VESTIBULAR STRENGTH:27705} {AMB PT VESTIBULAR STRENGTH:62615}    Shoulder IR {AMB PT VESTIBULAR STRENGTH:74992} {AMB PT VESTIBULAR STRENGTH:36520}    Shoulder ER  @ 0* Abduction {AMB PT VESTIBULAR STRENGTH:16653} {AMB PT VESTIBULAR STRENGTH:27782}    Shoulder ER  @ 90* Abduction {AMB PT VESTIBULAR STRENGTH:79132} {AMB PT VESTIBULAR STRENGTH:26713}    Elbow Flexion  {AMB PT VESTIBULAR STRENGTH:80166} {AMB PT VESTIBULAR STRENGTH:44189}    Elbow Extension {AMB PT VESTIBULAR STRENGTH:47393} {AMB PT VESTIBULAR STRENGTH:80399}    Rhomboids {AMB PT VESTIBULAR STRENGTH:24879} {AMB PT VESTIBULAR STRENGTH:74038}    Mid Traps {AMB PT VESTIBULAR STRENGTH:16731} {AMB PT VESTIBULAR STRENGTH:86895}    Low Traps {AMB PT VESTIBULAR STRENGTH:21206} {AMB PT VESTIBULAR STRENGTH:00319}      Hip Right Left Pain/Dysfunction with Movement   AROM/PROM      flexion  ***/***  ***/***    extension  ***/***  ***/***    abduction  ***/***  ***/***    adduction  ***/***  ***/***    Internal rotation   ***/***  ***/***    External rotation  ***/***  ***/***      Knee Right Left Pain/Dysfunction with Movement   AROM/PROM      flexion  ***/***  ***/***    extension  ***/***  ***/***      Ankle Right Left Pain/Dysfunction with Movement   AROM/PROM      dorsiflexion  ***/***  ***/***    plantarflexion  ***/***  ***/***    inversion  ***/***  ***/***    eversion  ***/***  ***/***      L/E MMT Right Left Pain/Dysfunction with Movement   Hip Flexion {AMB PT VESTIBULAR STRENGTH:56900} {AMB PT VESTIBULAR STRENGTH:77311}    Hip Extension {AMB PT VESTIBULAR STRENGTH:23672} {AMB PT VESTIBULAR STRENGTH:82106}    Hip Abduction {AMB PT VESTIBULAR STRENGTH:93264} {AMB PT VESTIBULAR STRENGTH:15907}    Hip Adduction {AMB PT VESTIBULAR STRENGTH:48329} {AMB PT VESTIBULAR STRENGTH:00252}    Hip IR {AMB PT VESTIBULAR STRENGTH:97243} {AMB PT VESTIBULAR STRENGTH:97376}    Hip ER {AMB PT VESTIBULAR STRENGTH:76813} {AMB PT VESTIBULAR STRENGTH:19928}    Knee Flexion {AMB PT VESTIBULAR STRENGTH:09607} {AMB PT VESTIBULAR STRENGTH:24715}    Knee Extension {AMB PT VESTIBULAR STRENGTH:78838} {AMB PT VESTIBULAR STRENGTH:54349}    Ankle DF {AMB PT VESTIBULAR STRENGTH:68125} {AMB PT VESTIBULAR STRENGTH:36592}    Ankle PF {AMB PT VESTIBULAR STRENGTH:63501} {AMB PT VESTIBULAR STRENGTH:79073}    Ankle Inversion {AMB PT VESTIBULAR STRENGTH:44071} {AMB PT VESTIBULAR STRENGTH:71333}    Ankle Eversion {AMB PT VESTIBULAR STRENGTH:99043} {AMB PT VESTIBULAR STRENGTH:92870}    Big Toe Extension {AMB PT VESTIBULAR STRENGTH:05950} {AMB PT VESTIBULAR STRENGTH:85288}      L/E Strength w/ MicroFET Muscle Joseluis Dynamometer Right Left Pain/Dysfunction with Movement   (approx 4 sec hold w/ max contraction)   Hip Flexion *** kg  *** kg     Hip Abduction *** kg  *** kg     Quadriceps *** kg  *** kg     Hamstrings *** kg  *** kg       Selective Functional Movement Assessment (SFMA) FN: functional, nonpainful. FP: functional, painful. DP: dysfunctional, painful. DN:  "dysfunctional, nonpainful.   active cervical flexion    active cervical extension    active cervical rotation R:  L:   UE1 (MRE) R:  L:   UE2 (LRF) R:   L:   multi-segmental flexion     multi-segmental extension    multi-segmental rotation  R:  L:   SLS R:   L:   overhead deep squat      Joint Mobility:   - Cervical: ***  - Thoracic: ***  - Lumbar: ***  - Other:  ***    Flexibility: ***    Special Tests: ***    Gait Analysis:{AD:96735} {PT GAIT ANALYSIS:74493} Deviations: ***    TUG:  *** seconds   TUG Cutoff Scores:***        30 second sit-to-stand test (without U/E support): 0 (*** w/ UE support)  30" sit to stand Cutoff Scores:***      Single Leg Stance: R *** seconds, L *** seconds    Balance: ***    Other: ***    CMS Impairment/Limitation/Restriction for FOTO *** Survey    Therapist reviewed FOTO scores for Pedro Pablo Bazan on 8/31/2020.   FOTO documents entered into EPIC - see Media section.    Limitation Score: ***%  Category: {Blank single:33367::"Other","Self Care","Body Position","Carrying","Mobility"}    Current : {G Codes:65078}  Goal: {G Codes:78507}         TREATMENT   Treatment Time In: ***  Treatment Time Out: ***  Total Treatment time separate from Evaluation: *** minutes    Pedro Pablo received therapeutic exercises to develop {AMB PT PROGRESS OBJECTIVE:23361} for *** minutes including:  ***    Pedro Pablo received the following manual therapy techniques: {AMB PT PROGRESS MANUAL THERAPY:97643} were applied to the: *** for *** minutes, including:  ***  Pedro Pablo participated in gait training to improve functional mobility and safety for ***  minutes, including:  ***    Pedro Pablo received cold pack for *** minutes to ***.      Home Exercises Provided and Patient Education Provided     Education provided:   - ***    Written Home Exercises Provided: {Blank single:79419::"yes","Patient instructed to cont prior HEP"}.  Exercises were reviewed and Pedro Pablo was able to demonstrate them prior to the end of the " "session.  Pedro Pablo demonstrated {Desc; good/fair/poor:53722} understanding of the education provided.     See EMR under {Blank single:52841::"Media","Patient Instructions"} for exercises provided {Blank single:26802::"8/31/2020","prior visit"}.      Assessment   Pedro Pablo is a 45 y.o. female referred to outpatient Physical Therapy with a medical diagnosis of ***. Pt presents with ***    Pt prognosis is {REHAB PROGNOSIS OHS:54076}.   Pt will benefit from skilled outpatient Physical Therapy to address the deficits stated above and in the chart below, provide pt/family education, and to maximize pt's level of independence.     Plan of care discussed with patient: {YES:37237}  Pt's spiritual, cultural and educational needs considered and patient is agreeable to the plan of care and goals as stated below:     Anticipated Barriers for therapy: ***    Medical Necessity is demonstrated by the following  History  Co-morbidities and personal factors that may impact the plan of care Co-morbidities:   {Co-morbidities:44973}    Personal Factors:   {Personal Factors:81994}     {Desc; low/moderate/high:135203}   Examination  Body Structures and Functions, activity limitations and participation restrictions that may impact the plan of care Body Regions:   {Body Regions:79327}    Body Systems:    {Body Systems:77106}    Participation Restrictions:   ***    Activity limitations:   Learning and applying knowledge  {Learning and applying knowledge:88823}    General Tasks and Commands  {Gen tasks and commands:93009}    Communication  {Communication:76201}    Mobility  {Mobility:13206}    Self care  {Self Care:99380}    Domestic Life  {Domestic Life:44440}    Interactions/Relationships  {Interactions/Relationships:26679}    Life Areas  {Life Areas:75532}    Community and Social Life  {Community/Social Life:28142}         {Desc; low/moderate/high:249015}   Clinical Presentation {Clinical Presentation :06138} {Desc; " "low/moderate/high:857026}   Decision Making/ Complexity Score: {Desc; low/moderate/high:913892}       Goals:  Short Term Goals: *** weeks   ***    Long Term Goals: *** weeks   ***    Plan   Plan of care Certification: 8/31/2020 to ***.    Outpatient Physical Therapy {NUMBERS 1-5:19836} times weekly for {0-10:84342::"0"} weeks to include the following interventions: {TX PLAN:30782}, ASTYM, Kinesiotaping PRN, Functional Dry Needling    Rodo Neal, PT, DPT    "

## 2020-08-31 NOTE — TELEPHONE ENCOUNTER
----- Message from Mirela Waite sent at 8/31/2020 12:20 PM CDT -----  Regarding: question  Contact: pt  Calling to ask  why is he sending her to a hematologist. [phone]

## 2020-08-31 NOTE — ASSESSMENT & PLAN NOTE
Checking labs.  Set patient up with Hematology with chronic profound anemia.  Patient should also follow-up with OBGYN concerning her menstrual cycles.    ER precautions.  Stop anti-inflammatory medication until anemia improved.

## 2020-08-31 NOTE — ASSESSMENT & PLAN NOTE
Start physical therapy, referral to occupational medicine for work restrictions.    Start using a cane to prevent falls.    Referral to Neurology is pending.    Fall precautions.  ER precautions.

## 2020-08-31 NOTE — ASSESSMENT & PLAN NOTE
Starting physical therapy, occupational medicine for work restrictions, patient advised to use walking assistive device at all times.  Fall precautions.

## 2020-08-31 NOTE — PLAN OF CARE
"[facility]  Physical Therapy Initial Evaluation    Name: Pedro Pablo Bazan  Clinic Number: [id number]    Therapy Diagnosis:   Encounter Diagnoses   Name Primary?    Referral of patient     Muscular dystrophy     Weakness     Gait instability     Fall, subsequent encounter      Physician: Oneal Acosta MD    Physician Orders: PT Eval and Treat  Medical Diagnosis from Referral: ***  Evaluation Date: 8/31/2020  Authorization Period Expiration: ***  Plan of Care Expiration: ***  Visit # / Visits authorized: ***/ ***  FOTO: 1/10    Gcode: 1/10  Visit:  ***  Total: ***    Time In: ***  Time Out: ***  Total Billable Time: *** minutes (*** Complexity Evaluation, Therapeutic Exercise - ***, Manual Therapy - ***)    Precautions: Standard, {IP WOUND PRECAUTIONS OHS:65388}    Subjective   Date of onset: ***  History of current condition - Pedro Pablo reports: ***     Medical History:   Past Medical History:   Diagnosis Date    Muscular dystrophy     per pt caregiver report at visit on 8/20/2020       Surgical History:   Pedro Pablo Bazan  has no past surgical history on file.    Medications:   Pedro Pablo has a current medication list which includes the following prescription(s): aspirin and ferrous sulfate.    Allergies:   Review of patient's allergies indicates:  No Known Allergies     Imaging, {Mri/ctscan/bone scan:47542}: ***    Prior Therapy: ***  Social History: *** {LIVES WITH:37998}  Occupation: ***  Prior Level of Function: ***  Current Level of Function: ***    Pain:   Current {0-10:85780::"0"}/10, worst {0-10:36085::"0"}/10, best {0-10:83038::"0"}/10   Location: {RIGHT LEFT BILATERAL:83577} {LOCATION ON BODY:35085}  Description: {Pain Description:80117}  Aggravating Factors: {Causes; Pain:65970}  Easing Factors: {Pain (activities that relieve):51997}    Pts goals: ***    Objective     Posture: ***  Palpation: ***  Sensation: ***  DTRs: ***  Range of Motion/Strength:   CERVICAL " AROM Pain/Dysfunction with Movement   Flexion ***    Extension ***    Right side bending ***    Left side bending ***    Right rotation ***    Left rotation ***      Thoracolumbar AROM Pain/Dysfunction with Movement   Flexion ***    Extension ***    Right side bending ***    Left side bending ***    Right rotation ***    Left rotation ***      Shoulder Right Left Pain/Dysfunction with Movement   AROM/PROM      flexion  ***/***  ***/***    extension  ***/***  ***/***    abduction  ***/***  ***/***    adduction  ***/***  ***/***    Internal rotation  ***/***  ***/***    ER at 90° abd  ***/***  ***/***    ER at 0° abd  ***/***  ***/***      Elbow Right Left Pain/Dysfunction with Movement   AROM/PROM      flexion  ***/***  ***/***    extension  ***/***  ***/***      U/E MMT Right Left Pain/Dysfunction with Movement   Shoulder Flexion {AMB PT VESTIBULAR STRENGTH:07952} {AMB PT VESTIBULAR STRENGTH:24419}    Shoulder Extension {AMB PT VESTIBULAR STRENGTH:08080} {AMB PT VESTIBULAR STRENGTH:20495}    Shoulder Abduction {AMB PT VESTIBULAR STRENGTH:15831} {AMB PT VESTIBULAR STRENGTH:46086}    Shoulder Adduction {AMB PT VESTIBULAR STRENGTH:31896} {AMB PT VESTIBULAR STRENGTH:96288}    Shoulder IR {AMB PT VESTIBULAR STRENGTH:63789} {AMB PT VESTIBULAR STRENGTH:16367}    Shoulder ER  @ 0* Abduction {AMB PT VESTIBULAR STRENGTH:13915} {AMB PT VESTIBULAR STRENGTH:99658}    Shoulder ER  @ 90* Abduction {AMB PT VESTIBULAR STRENGTH:33903} {AMB PT VESTIBULAR STRENGTH:16547}    Elbow Flexion  {AMB PT VESTIBULAR STRENGTH:95061} {AMB PT VESTIBULAR STRENGTH:85241}    Elbow Extension {AMB PT VESTIBULAR STRENGTH:66312} {AMB PT VESTIBULAR STRENGTH:17888}    Rhomboids {AMB PT VESTIBULAR STRENGTH:66090} {AMB PT VESTIBULAR STRENGTH:15398}    Mid Traps {AMB PT VESTIBULAR STRENGTH:74968} {AMB PT VESTIBULAR STRENGTH:50604}    Low Traps {AMB PT VESTIBULAR STRENGTH:52437} {AMB PT VESTIBULAR STRENGTH:41097}      Hip Right Left Pain/Dysfunction with  Movement   AROM/PROM      flexion  ***/***  ***/***    extension  ***/***  ***/***    abduction  ***/***  ***/***    adduction  ***/***  ***/***    Internal rotation  ***/***  ***/***    External rotation  ***/***  ***/***      Knee Right Left Pain/Dysfunction with Movement   AROM/PROM      flexion  ***/***  ***/***    extension  ***/***  ***/***      Ankle Right Left Pain/Dysfunction with Movement   AROM/PROM      dorsiflexion  ***/***  ***/***    plantarflexion  ***/***  ***/***    inversion  ***/***  ***/***    eversion  ***/***  ***/***      L/E MMT Right Left Pain/Dysfunction with Movement   Hip Flexion {AMB PT VESTIBULAR STRENGTH:98424} {AMB PT VESTIBULAR STRENGTH:34616}    Hip Extension {AMB PT VESTIBULAR STRENGTH:78254} {AMB PT VESTIBULAR STRENGTH:71829}    Hip Abduction {AMB PT VESTIBULAR STRENGTH:33240} {AMB PT VESTIBULAR STRENGTH:29709}    Hip Adduction {AMB PT VESTIBULAR STRENGTH:47986} {AMB PT VESTIBULAR STRENGTH:64015}    Hip IR {AMB PT VESTIBULAR STRENGTH:38759} {AMB PT VESTIBULAR STRENGTH:79961}    Hip ER {AMB PT VESTIBULAR STRENGTH:78669} {AMB PT VESTIBULAR STRENGTH:52899}    Knee Flexion {AMB PT VESTIBULAR STRENGTH:52420} {AMB PT VESTIBULAR STRENGTH:11008}    Knee Extension {AMB PT VESTIBULAR STRENGTH:94080} {AMB PT VESTIBULAR STRENGTH:45226}    Ankle DF {AMB PT VESTIBULAR STRENGTH:85412} {AMB PT VESTIBULAR STRENGTH:67587}    Ankle PF {AMB PT VESTIBULAR STRENGTH:20374} {AMB PT VESTIBULAR STRENGTH:48160}    Ankle Inversion {AMB PT VESTIBULAR STRENGTH:00998} {AMB PT VESTIBULAR STRENGTH:77976}    Ankle Eversion {AMB PT VESTIBULAR STRENGTH:83201} {AMB PT VESTIBULAR STRENGTH:49196}    Big Toe Extension {AMB PT VESTIBULAR STRENGTH:85139} {AMB PT VESTIBULAR STRENGTH:71517}      L/E Strength w/ MicroFET Muscle Joseluis Dynamometer Right Left Pain/Dysfunction with Movement   (approx 4 sec hold w/ max contraction)   Hip Flexion *** kg  *** kg     Hip Abduction *** kg  *** kg     Quadriceps *** kg  *** kg    "  Hamstrings *** kg  *** kg       Selective Functional Movement Assessment (SFMA) FN: functional, nonpainful. FP: functional, painful. DP: dysfunctional, painful. DN: dysfunctional, nonpainful.   active cervical flexion    active cervical extension    active cervical rotation R:  L:   UE1 (MRE) R:  L:   UE2 (LRF) R:   L:   multi-segmental flexion     multi-segmental extension    multi-segmental rotation  R:  L:   SLS R:   L:   overhead deep squat      Joint Mobility:   - Cervical: ***  - Thoracic: ***  - Lumbar: ***  - Other:  ***    Flexibility: ***    Special Tests: ***    Gait Analysis:{AD:26704} {PT GAIT ANALYSIS:57101} Deviations: ***    TUG:  *** seconds   TUG Cutoff Scores:***        30 second sit-to-stand test (without U/E support): 0 (*** w/ UE support)  30" sit to stand Cutoff Scores:***      Single Leg Stance: R *** seconds, L *** seconds    Balance: ***    Other: ***    CMS Impairment/Limitation/Restriction for FOTO *** Survey    Therapist reviewed FOTO scores for Pedro Pablo Bazan on 8/31/2020.   FOTO documents entered into [employer] - see Media section.    Limitation Score: ***%  Category: {Blank single:62498::"Other","Self Care","Body Position","Carrying","Mobility"}    Current : {G Codes:29985}  Goal: {G Codes:96619}         TREATMENT   Treatment Time In: ***  Treatment Time Out: ***  Total Treatment time separate from Evaluation: *** minutes    Pedro Pablo received therapeutic exercises to develop {AMB PT PROGRESS OBJECTIVE:27299} for *** minutes including:  ***    Pedro Pablo received the following manual therapy techniques: {AMB PT PROGRESS MANUAL THERAPY:72045} were applied to the: *** for *** minutes, including:  ***  Pedro Pablo participated in gait training to improve functional mobility and safety for ***  minutes, including:  ***    Pedro Pablo received cold pack for *** minutes to ***.      Home Exercises Provided and Patient Education Provided     Education provided:   - ***    Written Home Exercises " "Provided: {Blank single:06733::"yes","Patient instructed to cont prior HEP"}.  Exercises were reviewed and Pedro Pablo was able to demonstrate them prior to the end of the session.  Pedro Pablo demonstrated {Desc; good/fair/poor:66618} understanding of the education provided.     See EMR under {Blank single:30068::"Media","Patient Instructions"} for exercises provided {Blank single:32895::"8/31/2020","prior visit"}.      Assessment   Pedro Pablo is a 45 y.o. female referred to outpatient Physical Therapy with a medical diagnosis of ***. Pt presents with ***    Pt prognosis is {REHAB PROGNOSIS OHS:18781}.   Pt will benefit from skilled outpatient Physical Therapy to address the deficits stated above and in the chart below, provide pt/family education, and to maximize pt's level of independence.     Plan of care discussed with patient: {YES:90942}  Pt's spiritual, cultural and educational needs considered and patient is agreeable to the plan of care and goals as stated below:     Anticipated Barriers for therapy: ***    Medical Necessity is demonstrated by the following  History  Co-morbidities and personal factors that may impact the plan of care Co-morbidities:   {Co-morbidities:79956}    Personal Factors:   {Personal Factors:24100}     {Desc; low/moderate/high:903762}   Examination  Body Structures and Functions, activity limitations and participation restrictions that may impact the plan of care Body Regions:   {Body Regions:43638}    Body Systems:    {Body Systems:03348}    Participation Restrictions:   ***    Activity limitations:   Learning and applying knowledge  {Learning and applying knowledge:15484}    General Tasks and Commands  {Gen tasks and commands:84246}    Communication  {Communication:89418}    Mobility  {Mobility:48551}    Self care  {Self Care:17283}    Domestic Life  {Domestic Life:07915}    Interactions/Relationships  {Interactions/Relationships:11833}    Life Areas  {Life Areas:59393}    Community and " "Social Life  {Community/Social Life:64907}         {Desc; low/moderate/high:842290}   Clinical Presentation {Clinical Presentation :78698} {Desc; low/moderate/high:997036}   Decision Making/ Complexity Score: {Desc; low/moderate/high:940594}       Goals:  Short Term Goals: *** weeks   ***    Long Term Goals: *** weeks   ***    Plan   Plan of care Certification: 8/31/2020 to ***.    Outpatient Physical Therapy {NUMBERS 1-5:84705} times weekly for {0-10:10641::"0"} weeks to include the following interventions: {TX PLAN:32764}, ASTYM, Kinesiotaping PRN, Functional Dry Needling    Rodo Neal, PT, DPT  "

## 2020-08-31 NOTE — ASSESSMENT & PLAN NOTE
Sending of physical therapy, occupational medicine to evaluate the patient for work restrictions, neurology consult.

## 2020-08-31 NOTE — TELEPHONE ENCOUNTER
Returned call to patient, patient was notified of lab results and recommendations of Dr. Acosta, an appointment was scheduled for the patient with Hematology with Dr. Sylvester, patient verbalized understanding of this.

## 2020-09-01 PROBLEM — R26.89 BALANCE PROBLEM: Status: ACTIVE | Noted: 2020-09-01

## 2020-09-01 PROBLEM — M25.60 LIMITED JOINT RANGE OF MOTION (ROM): Status: ACTIVE | Noted: 2020-09-01

## 2020-09-01 PROBLEM — R29.898 DECREASED STRENGTH OF LOWER EXTREMITY: Status: ACTIVE | Noted: 2020-09-01

## 2020-09-02 ENCOUNTER — CLINICAL SUPPORT (OUTPATIENT)
Dept: REHABILITATION | Facility: HOSPITAL | Age: 45
End: 2020-09-02
Payer: COMMERCIAL

## 2020-09-02 DIAGNOSIS — R29.898 DECREASED STRENGTH OF LOWER EXTREMITY: ICD-10-CM

## 2020-09-02 DIAGNOSIS — M25.60 LIMITED JOINT RANGE OF MOTION (ROM): ICD-10-CM

## 2020-09-02 DIAGNOSIS — R26.89 BALANCE PROBLEM: ICD-10-CM

## 2020-09-02 PROCEDURE — 97110 THERAPEUTIC EXERCISES: CPT | Mod: PN

## 2020-09-02 NOTE — PLAN OF CARE
[facility]  Physical Therapy Initial Evaluation    Date: 8/31/2020   Name: Pedro Pablo Louis Beni  Clinic Number: [id number]    Therapy Diagnosis:   Encounter Diagnoses   Name Primary?    Muscular dystrophy     Weakness     Gait instability     Fall, subsequent encounter     Decreased strength of lower extremity     Limited joint range of motion (ROM)     Balance problem      Physician: Oneal Acosta MD    Physician Orders: PT Eval and Treat   Medical Diagnosis from Referral:   G71.00 (ICD-10-CM) - Muscular dystrophy   R53.1 (ICD-10-CM) - Weakness   R26.81 (ICD-10-CM) - Gait instability   W19.XXXD (ICD-10-CM) - Fall, subsequent encounter     Evaluation Date: 8/31/2020  Authorization Period Expiration: 12/31/20  Plan of Care Expiration: 10/26/20  Visit # / Visits authorized: 1/ 20    Time In: 9:00 AM  Time Out: 9:45 AM  Total Appointment Time (timed & untimed codes): 45 minutes    Precautions: Standard, anemia, hx of muscular dystrophy    Subjective   Date of onset: Approximately 3-4 months ago symptoms began to worsen, insidious onset.   History of current condition - Pedro Pablo reports progressive weakness of both shoulders and lower extremities for the last 3-4 months. During this time she reports multiple falls, her most recent one being near the end of June. Pt reports a history of muscualr dystrophy since she was a child and has not had many issues until recently. Due to this recent change, she has been unable to work. After her recent MD appointment, a cane was ordered and she was advised to use this to avoid any additional falls. She currently lives with her mother who is able to assist her if needed. Pt reports that pain is most prevalent in her R shoulder, causing difficulty to lift objects.        Medical History:   Past Medical History:   Diagnosis Date    Muscular dystrophy     per pt caregiver report at visit on 8/20/2020       Surgical History:   Pedro Pablo Louis  Beni  has no past surgical history on file.    Medications:   Pedro Pablo has a current medication list which includes the following prescription(s): aspirin and ferrous sulfate.    Allergies:   Review of patient's allergies indicates:  No Known Allergies     Imaging, Xray  FINDINGS:  No evidence of acute fracture or dislocation.  Mild degenerative changes of bilateral acromioclavicular and glenohumeral joints, not significantly changed as compared to the prior study.  No abnormal soft tissue calcification or foreign body.     Electronically signed by: Wilfred Madrigal  Date:                                            2020  Time:                                           14:43    Prior Therapy: NA  Social History: lives with their family  Occupation: none at this time  Prior Level of Function: Independent  Current Level of Function: SBA    Pain:  Current 6/10, worst 10/10, best 5/10   Location: right shoulder  Description: Aching, Tight and Sharp  Aggravating Factors: Lifting  Easing Factors: rest    Patients goals: improve overall mobility and balance    Objective     Observation: impaired gait pattern; shortened step length, decreased heel strike, shuffled gait    Posture: forward shoulders, increased kyphosis    Shoulder AROM  RUE abduction:  70 deg  RUE flexion:   85 deg  LUE abduction:  85 deg  LUE flexion:   110 deg    Lower Extremity Strength  Right LE  Left LE    Knee extension: 3+/5 Knee extension: 4-/5   Knee flexion: 3+/5 Knee flexion: 4-/5   Hip flexion: 3+/5 Hip flexion: 3+/5   Hip abduction: 3/5 Hip abduction: 3/5   Ankle dorsiflexion: 3+/5 Ankle dorsiflexion: 4-/5   Ankle plantarflexion: 3+/5 Ankle plantarflexion: 4-/5     Function:    - TU.88 sec  - SLS R: unable   - SLS L: 7 seconds    Palpation: min TTP to upper traps and R shoulder joint.     Sensation: Intact to light touch, bilaterally.     Flexibility: Hamstring 90/90 R side lacking 50 degrees, L side lacking 35 degrees      Limitation/Restriction for FOTO UE Survey    Therapist reviewed FOTO scores for Pedro Pablo Bazan on 8/31/2020.   FOTO documents entered into [employer] - see Media section.    Limitation Score: Will be assessed at next treatment session if possible.          TREATMENT   Treatment Time In: 09:35 AM  Treatment Time Out: 09:45 AM  Total Treatment time (time-based codes) separate from Evaluation: 10 minutes    Pedro Pablo received therapeutic exercises to develop strength, endurance, ROM, flexibility, posture and core stabilization for 10 minutes including:  Supine hip adduction with pillow 3x10  Seated hamstring stretch, 3 min  Seated marching, 3 min    Possible at next visit: seated ball flexion stretch, NuStep, scapular retractions, standing weight shifts, short arc quad.    Home Exercises and Patient Education Provided    Education provided:   - HEP provided and reviewed  - Anatomy and Physiology of shoulder and lower extremity   - safety precautions to avoid future falls    Written Home Exercises Provided: yes.  Exercises were reviewed and Pedro Pablo was able to demonstrate them prior to the end of the session.  Pedro Pablo demonstrated good  understanding of the education provided.     See EMR under Patient Instructions for exercises provided 8/31/2020.    Assessment   Pedro Pablo is a 45 y.o. female referred to outpatient Physical Therapy with a medical diagnosis of Muscular dystrophy. Patient presents with decreased strength in BLE (R side more affected), decreased ROM in both shoulders (R side also more affected), decreased flexibility, limited RUE strength due to pain levels, impaired gait/balance, and overall decreased functional mobility. PT at this time will focus on addressing functional deficits and making safety awareness high priority to avoid any additional falls.     Patient prognosis is Fair.   Patientt will benefit from skilled outpatient Physical Therapy to address the deficits stated above and in the  chart below, provide patient /family education, and to maximize patientt's level of independence.     Plan of care discussed with patient: Yes  Patient's spiritual, cultural and educational needs considered and patient is agreeable to the plan of care and goals as stated below:     Anticipated Barriers for therapy: Patient depends on mother or caregiver for transportation.     Medical Necessity is demonstrated by the following  History  Co-morbidities and personal factors that may impact the plan of care Co-morbidities:   anemia, muscular dystrophy    Personal Factors:   no deficits     moderate   Examination  Body Structures and Functions, activity limitations and participation restrictions that may impact the plan of care Body Regions:   lower extremities  upper extremities    Body Systems:    ROM  strength  gross coordinated movement  balance  gait    Participation Restrictions:   Work, recreational    Activity limitations:   Learning and applying knowledge  no deficits    General Tasks and Commands  no deficits    Communication  no deficits    Mobility  lifting and carrying objects  walking    Self care  no deficits    Domestic Life  no deficits    Interactions/Relationships  no deficits    Life Areas  no deficits    Community and Social Life  community life  recreation and leisure         moderate   Clinical Presentation evolving clinical presentation with changing clinical characteristics moderate   Decision Making/ Complexity Score: moderate     Goals:  Short Term Goals: 4 weeks  - Pt will demonstrate independence with prescribed HEP to facilitate healing and improve outcome measures.   - Pt will increase RLE MMT values by at least a half grade to assist with activity tolerance and balance.   - Pt will increase R shoulder ROM by 10 degrees in abduction and flexion in order to assist with lifting objects.  - Pt will improve R hamstring flexibility by 10 degrees during 90/90 test.     Long Term Goals: 8 weeks    - Pt will achieve TUG score of 15 seconds or less independently to demonstrate improved independence.   - Pt will be able to demonstrate BLE gross MMT values of at least 4+ to assist with gait, balance and ADL participation.  - Pt will be able to tolerate overhead UE activities with pain 5/10 or less to assist with functional activities.     Plan   Plan of care Certification: 8/31/2020 to 10/26/20.    Possible at next visit: seated ball flexion stretch, NuStep, scapular retractions, standing weight shifts, short arc quad.    Outpatient Physical Therapy 2 times weekly for 8 weeks to include the following interventions: Gait Training, Manual Therapy, Moist Heat/ Ice, Patient Education, Therapeutic Activites and Therapeutic Exercise.     Rodo Neal, PT

## 2020-09-02 NOTE — PROGRESS NOTES
[facility]  Physical Therapy Initial Evaluation    Date: 8/31/2020   Name: Pedro Pablo Bazan  Clinic Number: [id number]    Therapy Diagnosis:   Encounter Diagnoses   Name Primary?    Referral of patient     Muscular dystrophy     Weakness     Gait instability     Fall, subsequent encounter     Decreased strength of lower extremity     Limited joint range of motion (ROM)     Balance problem      Physician: Oneal Acosta MD    Physician Orders: PT Eval and Treat   Medical Diagnosis from Referral:   G71.00 (ICD-10-CM) - Muscular dystrophy   R53.1 (ICD-10-CM) - Weakness   R26.81 (ICD-10-CM) - Gait instability   W19.XXXD (ICD-10-CM) - Fall, subsequent encounter     Evaluation Date: 8/31/2020  Authorization Period Expiration: 12/31/20  Plan of Care Expiration: 10/26/20  Visit # / Visits authorized: 1/ 20    Time In: 9:00 AM  Time Out: 9:45 AM  Total Appointment Time (timed & untimed codes): 45 minutes    Precautions: Standard, anemia, hx of muscular dystrophy    Subjective   Date of onset: Approximately 3-4 months ago symptoms began to worsen, insidious onset.   History of current condition - Pedro Pablo reports progressive weakness of both shoulders and lower extremities for the last 3-4 months. During this time she reports multiple falls, her most recent one being near the end of June. Pt reports a history of muscualr dystrophy since she was a child and has not had many issues until recently. Due to this recent change, she has been unable to work. After her recent MD appointment, a cane was ordered and she was advised to use this to avoid any additional falls. She currently lives with her mother who is able to assist her if needed. Pt reports that pain is most prevalent in her R shoulder, causing difficulty to lift objects.        Medical History:   Past Medical History:   Diagnosis Date    Muscular dystrophy     per pt caregiver report at visit on 8/20/2020       Surgical  History:   Pedro Pablo Bazan  has no past surgical history on file.    Medications:   Pedro Pablo has a current medication list which includes the following prescription(s): aspirin and ferrous sulfate.    Allergies:   Review of patient's allergies indicates:  No Known Allergies     Imaging, Xray  FINDINGS:  No evidence of acute fracture or dislocation.  Mild degenerative changes of bilateral acromioclavicular and glenohumeral joints, not significantly changed as compared to the prior study.  No abnormal soft tissue calcification or foreign body.     Electronically signed by: Wilfred Madrigal  Date:                                            2020  Time:                                           14:43    Prior Therapy: NA  Social History: lives with their family  Occupation: none at this time  Prior Level of Function: Independent  Current Level of Function: SBA    Pain:  Current 6/10, worst 10/10, best 5/10   Location: right shoulder  Description: Aching, Tight and Sharp  Aggravating Factors: Lifting  Easing Factors: rest    Patients goals: improve overall mobility and balance    Objective     Observation: impaired gait pattern; shortened step length, decreased heel strike, shuffled gait    Posture: forward shoulders, increased kyphosis    Shoulder AROM  RUE abduction:  70 deg  RUE flexion:   85 deg  LUE abduction:  85 deg  LUE flexion:   110 deg    Lower Extremity Strength  Right LE  Left LE    Knee extension: 3+/5 Knee extension: 4-/5   Knee flexion: 3+/5 Knee flexion: 4-/5   Hip flexion: 3+/5 Hip flexion: 3+/5   Hip abduction: 3/5 Hip abduction: 3/5   Ankle dorsiflexion: 3+/5 Ankle dorsiflexion: 4-/5   Ankle plantarflexion: 3+/5 Ankle plantarflexion: 4-/5     Function:    - TU.88 sec  - SLS R: unable   - SLS L: 7 seconds    Palpation: min TTP to upper traps and R shoulder joint.     Sensation: Intact to light touch, bilaterally.     Flexibility: Hamstring 90/90 R side lacking 50 degrees, L side lacking 35  degrees     Limitation/Restriction for FOTO UE Survey    Therapist reviewed FOTO scores for Pedro Pablo Bazan on 8/31/2020.   FOTO documents entered into [employer] - see Media section.    Limitation Score: Will be assessed at next treatment session if possible.          TREATMENT   Treatment Time In: 09:35 AM  Treatment Time Out: 09:45 AM  Total Treatment time (time-based codes) separate from Evaluation: 10 minutes    Pedro Pablo received therapeutic exercises to develop strength, endurance, ROM, flexibility, posture and core stabilization for 10 minutes including:  Supine hip adduction with pillow 3x10  Seated hamstring stretch, 3 min  Seated marching, 3 min    Possible at next visit: seated ball flexion stretch, NuStep, scapular retractions, standing weight shifts, short arc quad.    Home Exercises and Patient Education Provided    Education provided:   - HEP provided and reviewed  - Anatomy and Physiology of shoulder and lower extremity   - safety precautions to avoid future falls    Written Home Exercises Provided: yes.  Exercises were reviewed and Pedro Pablo was able to demonstrate them prior to the end of the session.  Pedro Pablo demonstrated good  understanding of the education provided.     See EMR under Patient Instructions for exercises provided 8/31/2020.    Assessment   Pedro Pablo is a 45 y.o. female referred to outpatient Physical Therapy with a medical diagnosis of Muscular dystrophy. Patient presents with decreased strength in BLE (R side more affected), decreased ROM in both shoulders (R side also more affected), decreased flexibility, limited RUE strength due to pain levels, impaired gait/balance, and overall decreased functional mobility. PT at this time will focus on addressing functional deficits and making safety awareness high priority to avoid any additional falls.     Patient prognosis is Fair.   Patientt will benefit from skilled outpatient Physical Therapy to address the deficits stated above and in  the chart below, provide patient /family education, and to maximize patientt's level of independence.     Plan of care discussed with patient: Yes  Patient's spiritual, cultural and educational needs considered and patient is agreeable to the plan of care and goals as stated below:     Anticipated Barriers for therapy: Patient depends on mother or caregiver for transportation.     Medical Necessity is demonstrated by the following  History  Co-morbidities and personal factors that may impact the plan of care Co-morbidities:   anemia, muscular dystrophy    Personal Factors:   no deficits     moderate   Examination  Body Structures and Functions, activity limitations and participation restrictions that may impact the plan of care Body Regions:   lower extremities  upper extremities    Body Systems:    ROM  strength  gross coordinated movement  balance  gait    Participation Restrictions:   Work, recreational    Activity limitations:   Learning and applying knowledge  no deficits    General Tasks and Commands  no deficits    Communication  no deficits    Mobility  lifting and carrying objects  walking    Self care  no deficits    Domestic Life  no deficits    Interactions/Relationships  no deficits    Life Areas  no deficits    Community and Social Life  community life  recreation and leisure         moderate   Clinical Presentation evolving clinical presentation with changing clinical characteristics moderate   Decision Making/ Complexity Score: moderate     Goals:  Short Term Goals: 4 weeks  - Pt will demonstrate independence with prescribed HEP to facilitate healing and improve outcome measures.   - Pt will increase RLE MMT values by at least a half grade to assist with activity tolerance and balance.   - Pt will increase R shoulder ROM by 10 degrees in abduction and flexion in order to assist with lifting objects.  - Pt will improve R hamstring flexibility by 10 degrees during 90/90 test.     Long Term Goals: 8  weeks   - Pt will achieve TUG score of 15 seconds or less independently to demonstrate improved independence.   - Pt will be able to demonstrate BLE gross MMT values of at least 4+ to assist with gait, balance and ADL participation.  - Pt will be able to tolerate overhead UE activities with pain 5/10 or less to assist with functional activities.     Plan   Plan of care Certification: 8/31/2020 to 10/26/20.    Possible at next visit: seated ball flexion stretch, NuStep, scapular retractions, standing weight shifts, short arc quad.    Outpatient Physical Therapy 2 times weekly for 8 weeks to include the following interventions: Gait Training, Manual Therapy, Moist Heat/ Ice, Patient Education, Therapeutic Activites and Therapeutic Exercise.     Rodo Nael, PT

## 2020-09-02 NOTE — PROGRESS NOTES
Physical Therapy Daily Treatment Note     Name: Pedro Pablo Bazan  Clinic Number: [id number]    Therapy Diagnosis:   Encounter Diagnoses   Name Primary?    Decreased strength of lower extremity     Limited joint range of motion (ROM)     Balance problem      Physician: Oneal Acosta MD    Visit Date: 9/2/2020    Physician: Oneal Acosta MD     Physician Orders: PT Eval and Treat   Medical Diagnosis from Referral:   G71.00 (ICD-10-CM) - Muscular dystrophy   R53.1 (ICD-10-CM) - Weakness   R26.81 (ICD-10-CM) - Gait instability   W19.XXXD (ICD-10-CM) - Fall, subsequent encounter      Evaluation Date: 8/31/2020  Authorization Period Expiration: 12/31/20  Plan of Care Expiration: 10/26/20  Visit # / Visits authorized: 2/ 20    Time In: 09:35 AM  Time Out: 10:15 AM  Total Billable Time: 40 minutes    Precautions: Standard, anemia, hx of muscular dystrophy    Subjective     Pt reports: mild soreness following initial visit. She states that not much has changed since last session but she has remained compliant with HEP. She has been taking muscle relaxers when pain/soreness increase at home.   She was compliant with home exercise program.  Response to previous treatment: minimal   Functional change: minimal    Pain: 2/10  Location: right shoulder      Objective     Pedro Pablo received therapeutic exercises to develop strength, endurance, ROM, flexibility, posture and core stabilization for 45 minutes including:  NuStep 7 min  Seated ball flexion stretch 3 min  Seated ball squeeze/hip adduction 3 min  Seated hip abduction with YTB 3 min  Seated hamstring stretch, bilateral  Standing weight shifts 3 min  Seated marching, 3 min  Scapular retractions, 3 second hold x20     Possible at next visit: seated ball flexion stretch, NuStep, scapular retractions, standing weight shifts, short arc quad.    Home Exercises Provided and Patient Education Provided     Education provided:   - HEP review  - Safety while walking  and while at home  - Post exercise soreness and management    Written Home Exercises Provided: Patient instructed to cont prior HEP.  Exercises were reviewed and Pedro Pablo was able to demonstrate them prior to the end of the session.  Pedro Pablo demonstrated good  understanding of the education provided.     See EMR under Patient Instructions for exercises provided 9/2/2020.      Assessment     Pt tolerates today's treatment but does demonstrate some fatigue near the end of the session. Rest periods were taken as needed throughout the treatment. Therex introduced today was focused on gentle ROM, LE strengthening, and balance. Depending on her response to exercises, we plan to progress further as tolerated at next visit.   Pedro Pablo is progressing well towards her goals.   Pt prognosis is Fair.   Pt will continue to benefit from skilled outpatient physical therapy to address the deficits listed in the problem list box on initial evaluation, provide pt/family education and to maximize pt's level of independence in the home and community environment.     Pt's spiritual, cultural and educational needs considered and pt agreeable to plan of care and goals.    Anticipated Barriers for therapy: Patient depends on mother or caregiver for transportation.     Goals:  Short Term Goals: 4 weeks  - Pt will demonstrate independence with prescribed HEP to facilitate healing and improve outcome measures. (progressing, not met)  - Pt will increase RLE MMT values by at least a half grade to assist with activity tolerance and balance. (progressing, not met)  - Pt will increase R shoulder ROM by 10 degrees in abduction and flexion in order to assist with lifting objects. (progressing, not met)  - Pt will improve R hamstring flexibility by 10 degrees during 90/90 test. (progressing, not met)     Long Term Goals: 8 weeks   - Pt will achieve TUG score of 15 seconds or less independently to demonstrate improved independence. (progressing, not  met)  - Pt will be able to demonstrate BLE gross MMT values of at least 4+ to assist with gait, balance and ADL participation. (progressing, not met)  - Pt will be able to tolerate overhead UE activities with pain 5/10 or less to assist with functional activities. (progressing, not met)    Plan     Continue with current POC.     Possible at next visit: short arc quad, progressing current therex as tolerated.     Rodo Neal, PT

## 2020-09-09 ENCOUNTER — CLINICAL SUPPORT (OUTPATIENT)
Dept: REHABILITATION | Facility: HOSPITAL | Age: 45
End: 2020-09-09
Payer: COMMERCIAL

## 2020-09-09 DIAGNOSIS — M25.60 LIMITED JOINT RANGE OF MOTION (ROM): ICD-10-CM

## 2020-09-09 DIAGNOSIS — R29.898 DECREASED STRENGTH OF LOWER EXTREMITY: ICD-10-CM

## 2020-09-09 DIAGNOSIS — R26.89 BALANCE PROBLEM: ICD-10-CM

## 2020-09-09 PROCEDURE — 97110 THERAPEUTIC EXERCISES: CPT | Mod: PN

## 2020-09-09 NOTE — PROGRESS NOTES
"  Physical Therapy Daily Treatment Note     Name: Pedro Pablo Bazan  Clinic Number: [id number]    Therapy Diagnosis:   No diagnosis found.  Physician: Oneal Acosta MD    Visit Date: 9/9/2020    Physician: Oneal Acosta MD     Physician Orders: PT Eval and Treat   Medical Diagnosis from Referral:   G71.00 (ICD-10-CM) - Muscular dystrophy   R53.1 (ICD-10-CM) - Weakness   R26.81 (ICD-10-CM) - Gait instability   W19.XXXD (ICD-10-CM) - Fall, subsequent encounter      Evaluation Date: 8/31/2020  Authorization Period Expiration: 12/31/20  Plan of Care Expiration: 10/26/20  Visit # / Visits authorized: 3/ 20    Time In: 09:25 AM  Time Out: 10:10 AM  Total Billable Time: 45 minutes    Precautions: Standard, anemia, hx of muscular dystrophy    Subjective     Pt reports: that she is doing well at home and continues with her HEP. She had one incident of a "near fall" but was able to regain her balance, avoiding a fall. She states that overall she is feeling better but lately her neck has become stiff and it is difficult to lay still at night. Pt only had minimal soreness after last session.  She was compliant with home exercise program.  Response to previous treatment: Positive response with minimal soreness   Functional change: minimal    Pain: 3/10  Location: right shoulder/neck    Objective     Pedro Pablo received therapeutic exercises to develop strength, endurance, ROM, flexibility, posture and core stabilization for 40 minutes including:  NuStep 8 min  Seated ball flexion stretch 3 min  Seated ball squeeze/hip adduction (YTB) 3 min  Seated hip abduction with YTB 3 min  Seated hamstring stretch, bilateral    Seated marching, 3 min  Resisted rows with YTB, 3x10  Upper trap stretch 2 min  Levator scapulae stretch 2 min    Hot pack applied to R upper trap in seated position for 5 min     Possible at next visit: short arc quad, shuttle, rebounder    Home Exercises Provided and Patient Education Provided "     Education provided:   - HEP review  - Safety while walking and while at home  - Post exercise soreness and management    Written Home Exercises Provided: Patient instructed to cont prior HEP.  Exercises were reviewed and Pedro Pablo was able to demonstrate them prior to the end of the session.  Pedro Pablo demonstrated good  understanding of the education provided.     See EMR under Patient Instructions for exercises provided 9/2/2020.      Assessment     Pt again tolerates therex progression with little to no fatigue today. She only requires small rest periods and demonstrates no instability during standing exercises. Upper trap and levator stretches were added to reduce discomfort and stiffness of neck with activities and at night. She was also able to tolerate progression on NuStep and TB activities. At next visit, we will plan to begin balance activities if tolerated.  Pedro Pablo is progressing well towards her goals.   Pt prognosis is Fair.   Pt will continue to benefit from skilled outpatient physical therapy to address the deficits listed in the problem list box on initial evaluation, provide pt/family education and to maximize pt's level of independence in the home and community environment.     Pt's spiritual, cultural and educational needs considered and pt agreeable to plan of care and goals.    Anticipated Barriers for therapy: Patient depends on mother or caregiver for transportation.     Goals:  Short Term Goals: 4 weeks  - Pt will demonstrate independence with prescribed HEP to facilitate healing and improve outcome measures. (progressing, not met)  - Pt will increase RLE MMT values by at least a half grade to assist with activity tolerance and balance. (progressing, not met)  - Pt will increase R shoulder ROM by 10 degrees in abduction and flexion in order to assist with lifting objects. (progressing, not met)  - Pt will improve R hamstring flexibility by 10 degrees during 90/90 test. (progressing, not  met)     Long Term Goals: 8 weeks   - Pt will achieve TUG score of 15 seconds or less independently to demonstrate improved independence. (progressing, not met)  - Pt will be able to demonstrate BLE gross MMT values of at least 4+ to assist with gait, balance and ADL participation. (progressing, not met)  - Pt will be able to tolerate overhead UE activities with pain 5/10 or less to assist with functional activities. (progressing, not met)    Plan     Continue with current POC.     Possible at next visit: short arc quad, Shuttle, rebounder    Rodo Neal, PT

## 2020-09-11 ENCOUNTER — OFFICE VISIT (OUTPATIENT)
Dept: HEMATOLOGY/ONCOLOGY | Facility: CLINIC | Age: 45
End: 2020-09-11
Payer: COMMERCIAL

## 2020-09-11 ENCOUNTER — TELEPHONE (OUTPATIENT)
Dept: ORTHOPEDICS | Facility: CLINIC | Age: 45
End: 2020-09-11

## 2020-09-11 ENCOUNTER — LAB VISIT (OUTPATIENT)
Dept: LAB | Facility: HOSPITAL | Age: 45
End: 2020-09-11
Attending: INTERNAL MEDICINE
Payer: COMMERCIAL

## 2020-09-11 VITALS
DIASTOLIC BLOOD PRESSURE: 90 MMHG | TEMPERATURE: 99 F | BODY MASS INDEX: 21.07 KG/M2 | HEART RATE: 75 BPM | HEIGHT: 64 IN | WEIGHT: 123.44 LBS | SYSTOLIC BLOOD PRESSURE: 150 MMHG | OXYGEN SATURATION: 100 %

## 2020-09-11 DIAGNOSIS — D64.9 ANEMIA, UNSPECIFIED TYPE: Chronic | ICD-10-CM

## 2020-09-11 DIAGNOSIS — R60.0 LEG EDEMA, LEFT: ICD-10-CM

## 2020-09-11 DIAGNOSIS — D53.9 NUTRITIONAL ANEMIA: ICD-10-CM

## 2020-09-11 DIAGNOSIS — M25.511 CHRONIC RIGHT SHOULDER PAIN: ICD-10-CM

## 2020-09-11 DIAGNOSIS — G89.29 CHRONIC RIGHT SHOULDER PAIN: ICD-10-CM

## 2020-09-11 DIAGNOSIS — D64.9 ANEMIA, UNSPECIFIED TYPE: Primary | Chronic | ICD-10-CM

## 2020-09-11 LAB
ANISOCYTOSIS BLD QL SMEAR: SLIGHT
BASOPHILS # BLD AUTO: 0.06 K/UL (ref 0–0.2)
BASOPHILS NFR BLD: 0.8 % (ref 0–1.9)
DIFFERENTIAL METHOD: ABNORMAL
EOSINOPHIL # BLD AUTO: 0 K/UL (ref 0–0.5)
EOSINOPHIL NFR BLD: 0.4 % (ref 0–8)
ERYTHROCYTE [DISTWIDTH] IN BLOOD BY AUTOMATED COUNT: 27.4 % (ref 11.5–14.5)
HCT VFR BLD AUTO: 33 % (ref 37–48.5)
HGB BLD-MCNC: 8.8 G/DL (ref 12–16)
HYPOCHROMIA BLD QL SMEAR: ABNORMAL
IMM GRANULOCYTES # BLD AUTO: 0.02 K/UL (ref 0–0.04)
IMM GRANULOCYTES NFR BLD AUTO: 0.3 % (ref 0–0.5)
LYMPHOCYTES # BLD AUTO: 1.6 K/UL (ref 1–4.8)
LYMPHOCYTES NFR BLD: 22.2 % (ref 18–48)
MCH RBC QN AUTO: 17.7 PG (ref 27–31)
MCHC RBC AUTO-ENTMCNC: 26.7 G/DL (ref 32–36)
MCV RBC AUTO: 66 FL (ref 82–98)
MONOCYTES # BLD AUTO: 0.9 K/UL (ref 0.3–1)
MONOCYTES NFR BLD: 11.9 % (ref 4–15)
NEUTROPHILS # BLD AUTO: 4.6 K/UL (ref 1.8–7.7)
NEUTROPHILS NFR BLD: 64.4 % (ref 38–73)
NRBC BLD-RTO: 0 /100 WBC
OVALOCYTES BLD QL SMEAR: ABNORMAL
PLATELET # BLD AUTO: 497 K/UL (ref 150–350)
PLATELET BLD QL SMEAR: ABNORMAL
PMV BLD AUTO: 9.4 FL (ref 9.2–12.9)
POIKILOCYTOSIS BLD QL SMEAR: SLIGHT
RBC # BLD AUTO: 4.97 M/UL (ref 4–5.4)
STOMATOCYTES BLD QL SMEAR: PRESENT
TSH SERPL DL<=0.005 MIU/L-ACNC: 0.78 UIU/ML (ref 0.4–4)
WBC # BLD AUTO: 7.12 K/UL (ref 3.9–12.7)

## 2020-09-11 PROCEDURE — 99999 PR PBB SHADOW E&M-EST. PATIENT-LVL III: CPT | Mod: PBBFAC,,, | Performed by: INTERNAL MEDICINE

## 2020-09-11 PROCEDURE — 86703 HIV-1/HIV-2 1 RESULT ANTBDY: CPT

## 2020-09-11 PROCEDURE — 84165 PROTEIN E-PHORESIS SERUM: CPT

## 2020-09-11 PROCEDURE — 82607 VITAMIN B-12: CPT

## 2020-09-11 PROCEDURE — 83520 IMMUNOASSAY QUANT NOS NONAB: CPT | Mod: 59

## 2020-09-11 PROCEDURE — 36415 COLL VENOUS BLD VENIPUNCTURE: CPT

## 2020-09-11 PROCEDURE — 84165 PROTEIN E-PHORESIS SERUM: CPT | Mod: 26,,, | Performed by: PATHOLOGY

## 2020-09-11 PROCEDURE — 84165 PATHOLOGIST INTERPRETATION SPE: ICD-10-PCS | Mod: 26,,, | Performed by: PATHOLOGY

## 2020-09-11 PROCEDURE — 86334 IMMUNOFIX E-PHORESIS SERUM: CPT | Mod: 26,,, | Performed by: PATHOLOGY

## 2020-09-11 PROCEDURE — 99244 PR OFFICE CONSULTATION,LEVEL IV: ICD-10-PCS | Mod: S$GLB,,, | Performed by: INTERNAL MEDICINE

## 2020-09-11 PROCEDURE — 87340 HEPATITIS B SURFACE AG IA: CPT

## 2020-09-11 PROCEDURE — 86704 HEP B CORE ANTIBODY TOTAL: CPT

## 2020-09-11 PROCEDURE — 86334 IMMUNOFIX E-PHORESIS SERUM: CPT

## 2020-09-11 PROCEDURE — 99999 PR PBB SHADOW E&M-EST. PATIENT-LVL III: ICD-10-PCS | Mod: PBBFAC,,, | Performed by: INTERNAL MEDICINE

## 2020-09-11 PROCEDURE — 84443 ASSAY THYROID STIM HORMONE: CPT

## 2020-09-11 PROCEDURE — 82746 ASSAY OF FOLIC ACID SERUM: CPT

## 2020-09-11 PROCEDURE — 85025 COMPLETE CBC W/AUTO DIFF WBC: CPT

## 2020-09-11 PROCEDURE — 86334 PATHOLOGIST INTERPRETATION IFE: ICD-10-PCS | Mod: 26,,, | Performed by: PATHOLOGY

## 2020-09-11 PROCEDURE — 99244 OFF/OP CNSLTJ NEW/EST MOD 40: CPT | Mod: S$GLB,,, | Performed by: INTERNAL MEDICINE

## 2020-09-11 RX ORDER — SODIUM CHLORIDE 0.9 % (FLUSH) 0.9 %
10 SYRINGE (ML) INJECTION
Status: CANCELLED | OUTPATIENT
Start: 2020-10-17

## 2020-09-11 RX ORDER — TRAMADOL HYDROCHLORIDE 50 MG/1
50 TABLET ORAL EVERY 8 HOURS PRN
Qty: 50 TABLET | Refills: 0 | Status: SHIPPED | OUTPATIENT
Start: 2020-09-11 | End: 2020-11-03 | Stop reason: SDUPTHER

## 2020-09-11 RX ORDER — SODIUM CHLORIDE 0.9 % (FLUSH) 0.9 %
10 SYRINGE (ML) INJECTION
Status: CANCELLED | OUTPATIENT
Start: 2020-09-11

## 2020-09-11 RX ORDER — HEPARIN 100 UNIT/ML
500 SYRINGE INTRAVENOUS
Status: CANCELLED | OUTPATIENT
Start: 2020-09-11

## 2020-09-11 RX ORDER — HEPARIN 100 UNIT/ML
500 SYRINGE INTRAVENOUS
Status: CANCELLED | OUTPATIENT
Start: 2020-10-17

## 2020-09-11 NOTE — PROGRESS NOTES
Subjective:       Patient ID: Pedro Pablo Bazan is a 45 y.o. female.    Chief Complaint: Anemia, unspecified type [D64.9]  HPI: We have an opportunity to see Ms. Pedro Pablo Bazan in Hematology Oncology clinic at [facility] on 09/10/2020.  Ms. Pedro Pablo Bazan is a 45 y.o. woman presents for evaluation of anemia.  Has fatigue, denies SOB, CP.  Has LLE edema.    Oncology History    No history exists.     Past Medical History:   Diagnosis Date    Muscular dystrophy     per pt caregiver report at visit on 8/20/2020     No family history on file.  Social History     Socioeconomic History    Marital status: Single     Spouse name: Not on file    Number of children: Not on file    Years of education: Not on file    Highest education level: Not on file   Occupational History    Not on file   Social Needs    Financial resource strain: Not very hard    Food insecurity     Worry: Never true     Inability: Never true    Transportation needs     Medical: No     Non-medical: No   Tobacco Use    Smoking status: Never Smoker    Smokeless tobacco: Never Used   Substance and Sexual Activity    Alcohol use: Never     Frequency: Never    Drug use: Never    Sexual activity: Not Currently   Lifestyle    Physical activity     Days per week: Not on file     Minutes per session: Not on file    Stress: Only a little   Relationships    Social connections     Talks on phone: More than three times a week     Gets together: More than three times a week     Attends Synagogue service: More than 4 times per year     Active member of club or organization: No     Attends meetings of clubs or organizations: Never     Relationship status: Never    Other Topics Concern    Not on file   Social History Narrative    Not on file     No past surgical history on file.  Current Outpatient Medications   Medication Sig Dispense Refill    aspirin 81 MG Chew Take 81 mg by mouth once daily.      ferrous sulfate  (IRON, FERROUS SULFATE,) 325 mg (65 mg iron) Tab tablet Take 325 mg by mouth 2 (two) times daily.       No current facility-administered medications for this visit.        Labs:  Lab Results   Component Value Date    WBC 5.30 09/21/2016    HGB 9.0 (L) 09/21/2016    HCT 31.2 (L) 09/21/2016    MCV 65 (L) 09/21/2016     (H) 09/21/2016     BMP  Lab Results   Component Value Date     08/28/2020    K 4.7 08/28/2020     08/28/2020    CO2 24 08/28/2020    BUN 9 08/28/2020    CREATININE 0.8 08/28/2020    CALCIUM 9.0 08/28/2020    ANIONGAP 10 08/28/2020    ESTGFRAFRICA >60.0 08/28/2020    EGFRNONAA >60.0 08/28/2020     Lab Results   Component Value Date    ALT 56 (H) 08/28/2020    AST 43 (H) 08/28/2020    ALKPHOS 61 08/28/2020    BILITOT 0.3 08/28/2020       Lab Results   Component Value Date    IRON 10 (L) 08/28/2020    TIBC 630 (H) 08/28/2020    FERRITIN 11 (L) 08/28/2020     No results found for: [id number]  No results found for: FOLATE  Lab Results   Component Value Date    TSH 1.206 08/28/2020       I have reviewed the radiology reports and examined the scan/xray images.    Review of Systems   Constitutional: Negative.    HENT: Negative.    Eyes: Negative.    Respiratory: Negative.    Cardiovascular: Negative.    Gastrointestinal: Negative.    Endocrine: Negative.    Genitourinary: Negative.    Musculoskeletal: Negative.    Skin: Negative.    Allergic/Immunologic: Negative.    Neurological: Negative.    Hematological: Negative.    Psychiatric/Behavioral: Negative.      ECOG SCORE    0 - Fully active-able to carry on all pre-disease performance without restriction            Objective:     Vitals:    09/11/20 1541   BP: (!) 150/90   Pulse: 75   Temp: 98.9 °F (37.2 °C)   Body mass index is 21.19 kg/m².  Physical Exam  Vitals signs and nursing note reviewed.   Constitutional:       Appearance: She is well-developed.   HENT:      Head: Normocephalic and atraumatic.   Eyes:      Conjunctiva/sclera:  Conjunctivae normal.   Neck:      Musculoskeletal: Normal range of motion and neck supple.   Cardiovascular:      Rate and Rhythm: Normal rate and regular rhythm.   Pulmonary:      Effort: Pulmonary effort is normal.      Breath sounds: Normal breath sounds.   Abdominal:      General: Bowel sounds are normal.      Palpations: Abdomen is soft.   Musculoskeletal: Normal range of motion.      Left lower leg: Edema present.   Skin:     General: Skin is warm and dry.   Neurological:      Mental Status: She is alert and oriented to person, place, and time.   Psychiatric:         Behavior: Behavior normal.         Thought Content: Thought content normal.         Judgment: Judgment normal.           Assessment:      1. Anemia, unspecified type    2. Chronic right shoulder pain    3. Nutritional anemia    4. Leg edema, left           Plan:     Anemia, unspecified type  Will give iv injectafer x 2 for iron deficiency. Workup for other causes of anemia.  -     CBC auto differential; Future; Expected date: 09/11/2020  -     Hepatitis B Surface Antigen; Future; Expected date: 09/11/2020  -     Hepatitis B Core Antibody, Total; Future; Expected date: 09/11/2020  -     HIV 1/2 Ag/Ab (4th Gen); Future; Expected date: 09/11/2020  -     Protein electrophoresis, serum; Future; Expected date: 09/11/2020  -     Vitamin B12; Future; Expected date: 09/11/2020  -     Folate; Future; Expected date: 09/11/2020  -     TSH; Future; Expected date: 09/11/2020  -     Immunofixation Electrophoresis; Future; Expected date: 09/11/2020  -     Immunoglobulin Free LT Chains Blood; Future; Expected date: 09/11/2020    Chronic right shoulder pain  -     Ambulatory referral/consult to Orthopedics; Future; Expected date: 09/18/2020  -     traMADoL (ULTRAM) 50 mg tablet; Take 1 tablet (50 mg total) by mouth every 8 (eight) hours as needed for Pain.  Dispense: 50 tablet; Refill: 0    Nutritional anemia  -     Vitamin B12; Future; Expected date: 09/11/2020  -      Folate; Future; Expected date: 09/11/2020  -     TSH; Future; Expected date: 09/11/2020    Leg edema, left  -     US Lower Extremity Veins Left; Future; Expected date: 09/11/2020

## 2020-09-11 NOTE — LETTER
September 11, 2020      Oneal Acosta MD  [address]  [place] [address]           [facility] - Hematology Oncology  [address] [place] [address]  Phone: [phone]  Fax: [phone]          Patient: Pedro Pablo Bazan   MR Number: [id number]   YOB: 1975   Date of Visit: 9/11/2020       Dear Dr. Oneal Acosta:    Thank you for referring Pedro Pablo Bazan to me for evaluation. Attached you will find relevant portions of my assessment and plan of care.    If you have questions, please do not hesitate to call me. I look forward to following Pedro Pablo Bazan along with you.    Sincerely,    Kirill Sylvester MD    Enclosure  CC:  No Recipients    If you would like to receive this communication electronically, please contact [email or web address] or [phone] to request more information on [employer] access.    For providers and/or their staff who would like to refer a patient to [facility], please contact us through our one-stop-shop provider referral line, [facility] , at [phone].    If you feel you have received this communication in error or would no longer like to receive these types of communications, please e-mail [email or web address]

## 2020-09-11 NOTE — TELEPHONE ENCOUNTER
Called and spoke with patients son and he advised that he will give her a message to call our office back if she wishes to schedule an appointment with Dr. Zuniga for her shoulder.

## 2020-09-12 LAB
FOLATE SERPL-MCNC: 11.8 NG/ML (ref 4–24)
VIT B12 SERPL-MCNC: 786 PG/ML (ref 210–950)

## 2020-09-14 ENCOUNTER — CLINICAL SUPPORT (OUTPATIENT)
Dept: REHABILITATION | Facility: HOSPITAL | Age: 45
End: 2020-09-14
Payer: COMMERCIAL

## 2020-09-14 DIAGNOSIS — R26.89 BALANCE PROBLEM: ICD-10-CM

## 2020-09-14 DIAGNOSIS — M25.60 LIMITED JOINT RANGE OF MOTION (ROM): ICD-10-CM

## 2020-09-14 DIAGNOSIS — R29.898 DECREASED STRENGTH OF LOWER EXTREMITY: ICD-10-CM

## 2020-09-14 LAB
ALBUMIN SERPL ELPH-MCNC: 4.18 G/DL (ref 3.35–5.55)
ALPHA1 GLOB SERPL ELPH-MCNC: 0.27 G/DL (ref 0.17–0.41)
ALPHA2 GLOB SERPL ELPH-MCNC: 0.58 G/DL (ref 0.43–0.99)
B-GLOBULIN SERPL ELPH-MCNC: 0.84 G/DL (ref 0.5–1.1)
GAMMA GLOB SERPL ELPH-MCNC: 1.33 G/DL (ref 0.67–1.58)
HBV CORE AB SERPL QL IA: NEGATIVE
HBV SURFACE AG SERPL QL IA: NEGATIVE
HIV 1+2 AB+HIV1 P24 AG SERPL QL IA: NEGATIVE
INTERPRETATION SERPL IFE-IMP: NORMAL
KAPPA LC SER QL IA: 1.96 MG/DL (ref 0.33–1.94)
KAPPA LC/LAMBDA SER IA: 1.23 (ref 0.26–1.65)
LAMBDA LC SER QL IA: 1.59 MG/DL (ref 0.57–2.63)
PROT SERPL-MCNC: 7.2 G/DL (ref 6–8.4)

## 2020-09-14 PROCEDURE — 97140 MANUAL THERAPY 1/> REGIONS: CPT | Mod: PN

## 2020-09-14 PROCEDURE — 97110 THERAPEUTIC EXERCISES: CPT | Mod: PN

## 2020-09-14 NOTE — PROGRESS NOTES
"  Physical Therapy Daily Treatment Note     Name: Pedro Pablo Bazan  Clinic Number: [id number]    Therapy Diagnosis:   Encounter Diagnoses   Name Primary?    Decreased strength of lower extremity     Limited joint range of motion (ROM)     Balance problem      Physician: Oneal Acosta MD    Visit Date: 9/14/2020    Physician: Oneal Acosta MD     Physician Orders: PT Eval and Treat   Medical Diagnosis from Referral:   G71.00 (ICD-10-CM) - Muscular dystrophy   R53.1 (ICD-10-CM) - Weakness   R26.81 (ICD-10-CM) - Gait instability   W19.XXXD (ICD-10-CM) - Fall, subsequent encounter      Evaluation Date: 8/31/2020  Authorization Period Expiration: 12/31/20  Plan of Care Expiration: 10/26/20  Visit # / Visits authorized: 4/ 20    Time In: 09:30 AM  Time Out: 10:15 AM  Total Billable Time: 45 minutes    Precautions: Standard, anemia, hx of muscular dystrophy    Subjective     Pt reports: that overall she has been feeling better but states that she fell last Thursday while walking in her home. She has no change in status or injuries from this fall and states she is "doing okay." Main complaint remains to be right shoulder/neck discomfort and unsteadiness.   She was compliant with home exercise program.  Response to previous treatment: Positive response with minimal soreness   Functional change: minimal    Pain: 3/10  Location: right shoulder/neck    Objective     Pedro Pablo received therapeutic exercises to develop strength, endurance, ROM, flexibility, posture and core stabilization for 35 minutes including:  NuStep 8 min  Seated ball flexion stretch 3 min  Seated ball squeeze/hip adduction (YTB) 3 min  Seated hip abduction with YTB 3 min        Resisted rows with YTB, 3x10  Upper trap stretch 2 min  Levator scapulae stretch 2 min  rebounder x30     Pedro Pablo received the following manual therapy techniques: Soft tissue Mobilization were applied to the: R upper trap and levator scapulae for 10 minutes, " including:  IASTM.    Hot pack applied to R upper trap in seated position for 5 min     Possible at next visit: short arc quad, shuttle, progression of current therex.    Home Exercises Provided and Patient Education Provided     Education provided:   - HEP review  - Safety while walking and while at home  - Post exercise soreness and management    Written Home Exercises Provided: Patient instructed to cont prior HEP.  Exercises were reviewed and Pedro Pablo was able to demonstrate them prior to the end of the session.  Pedro Pablo demonstrated good  understanding of the education provided.     See EMR under Patient Instructions for exercises provided 9/2/2020.      Assessment     Pt tolerates additional therex today without any complaints. Her gait pattern has improved but she still presents with deficits. Pt was educated thoroughly on safety while at home in order to avoid any additional falls. She states that her assistive device will be in soon. Pt would benefit from continuance of PT and additional progression as tolerated to address her remaining deficits. She has positive response immediately following IASTM and modalities.   Pedro Pablo is progressing well towards her goals.   Pt prognosis is Fair.   Pt will continue to benefit from skilled outpatient physical therapy to address the deficits listed in the problem list box on initial evaluation, provide pt/family education and to maximize pt's level of independence in the home and community environment.     Pt's spiritual, cultural and educational needs considered and pt agreeable to plan of care and goals.    Anticipated Barriers for therapy: Patient depends on mother or caregiver for transportation.     Goals:  Short Term Goals: 4 weeks  - Pt will demonstrate independence with prescribed HEP to facilitate healing and improve outcome measures. (progressing, not met)  - Pt will increase RLE MMT values by at least a half grade to assist with activity tolerance and  balance. (progressing, not met)  - Pt will increase R shoulder ROM by 10 degrees in abduction and flexion in order to assist with lifting objects. (progressing, not met)  - Pt will improve R hamstring flexibility by 10 degrees during 90/90 test. (progressing, not met)     Long Term Goals: 8 weeks   - Pt will achieve TUG score of 15 seconds or less independently to demonstrate improved independence. (progressing, not met)  - Pt will be able to demonstrate BLE gross MMT values of at least 4+ to assist with gait, balance and ADL participation. (progressing, not met)  - Pt will be able to tolerate overhead UE activities with pain 5/10 or less to assist with functional activities. (progressing, not met)    Plan     Continue with current POC.     Possible at next visit: short arc quad, Shuttle, progression of current therex.     Rodo Neal, PT

## 2020-09-15 LAB
PATHOLOGIST INTERPRETATION IFE: NORMAL
PATHOLOGIST INTERPRETATION SPE: NORMAL

## 2020-09-16 ENCOUNTER — OFFICE VISIT (OUTPATIENT)
Dept: ORTHOPEDICS | Facility: CLINIC | Age: 45
End: 2020-09-16
Payer: COMMERCIAL

## 2020-09-16 ENCOUNTER — CLINICAL SUPPORT (OUTPATIENT)
Dept: REHABILITATION | Facility: HOSPITAL | Age: 45
End: 2020-09-16
Payer: COMMERCIAL

## 2020-09-16 VITALS — RESPIRATION RATE: 15 BRPM | BODY MASS INDEX: 21.07 KG/M2 | WEIGHT: 123.44 LBS | HEIGHT: 64 IN

## 2020-09-16 DIAGNOSIS — R26.89 BALANCE PROBLEM: ICD-10-CM

## 2020-09-16 DIAGNOSIS — G89.29 CHRONIC RIGHT SHOULDER PAIN: ICD-10-CM

## 2020-09-16 DIAGNOSIS — R29.898 DECREASED STRENGTH OF LOWER EXTREMITY: ICD-10-CM

## 2020-09-16 DIAGNOSIS — M75.100 ROTATOR CUFF SYNDROME, UNSPECIFIED LATERALITY: ICD-10-CM

## 2020-09-16 DIAGNOSIS — M25.511 CHRONIC RIGHT SHOULDER PAIN: ICD-10-CM

## 2020-09-16 DIAGNOSIS — M25.511 ACUTE PAIN OF RIGHT SHOULDER: Primary | ICD-10-CM

## 2020-09-16 DIAGNOSIS — M25.60 LIMITED JOINT RANGE OF MOTION (ROM): ICD-10-CM

## 2020-09-16 PROCEDURE — 3008F PR BODY MASS INDEX (BMI) DOCUMENTED: ICD-10-PCS | Mod: CPTII,S$GLB,, | Performed by: ORTHOPAEDIC SURGERY

## 2020-09-16 PROCEDURE — 97110 THERAPEUTIC EXERCISES: CPT | Mod: PN

## 2020-09-16 PROCEDURE — 99213 OFFICE O/P EST LOW 20 MIN: CPT | Mod: S$GLB,,, | Performed by: ORTHOPAEDIC SURGERY

## 2020-09-16 PROCEDURE — 99999 PR PBB SHADOW E&M-EST. PATIENT-LVL III: CPT | Mod: PBBFAC,,, | Performed by: ORTHOPAEDIC SURGERY

## 2020-09-16 PROCEDURE — 99999 PR PBB SHADOW E&M-EST. PATIENT-LVL III: ICD-10-PCS | Mod: PBBFAC,,, | Performed by: ORTHOPAEDIC SURGERY

## 2020-09-16 PROCEDURE — 99213 PR OFFICE/OUTPT VISIT, EST, LEVL III, 20-29 MIN: ICD-10-PCS | Mod: S$GLB,,, | Performed by: ORTHOPAEDIC SURGERY

## 2020-09-16 PROCEDURE — 3008F BODY MASS INDEX DOCD: CPT | Mod: CPTII,S$GLB,, | Performed by: ORTHOPAEDIC SURGERY

## 2020-09-16 PROCEDURE — 97140 MANUAL THERAPY 1/> REGIONS: CPT | Mod: PN

## 2020-09-16 RX ORDER — MELOXICAM 15 MG/1
TABLET ORAL
COMMUNITY
Start: 2020-09-13 | End: 2020-10-12

## 2020-09-16 RX ORDER — IBUPROFEN 800 MG/1
TABLET ORAL
COMMUNITY
Start: 2020-09-13 | End: 2020-10-01

## 2020-09-16 NOTE — PROGRESS NOTES
Past Medical History:   Diagnosis Date    Muscular dystrophy     per pt caregiver report at visit on 8/20/2020       History reviewed. No pertinent surgical history.    Current Outpatient Medications   Medication Sig    aspirin 81 MG Chew Take 81 mg by mouth once daily.    ferrous sulfate (IRON, FERROUS SULFATE,) 325 mg (65 mg iron) Tab tablet Take 325 mg by mouth 2 (two) times daily.    ibuprofen (ADVIL,MOTRIN) 800 MG tablet     meloxicam (MOBIC) 15 MG tablet     traMADoL (ULTRAM) 50 mg tablet Take 1 tablet (50 mg total) by mouth every 8 (eight) hours as needed for Pain.     No current facility-administered medications for this visit.        Review of patient's allergies indicates:  No Known Allergies    History reviewed. No pertinent family history.    Social History     Socioeconomic History    Marital status: Single     Spouse name: Not on file    Number of children: Not on file    Years of education: Not on file    Highest education level: Not on file   Occupational History    Not on file   Social Needs    Financial resource strain: Not very hard    Food insecurity     Worry: Never true     Inability: Never true    Transportation needs     Medical: No     Non-medical: No   Tobacco Use    Smoking status: Never Smoker    Smokeless tobacco: Never Used   Substance and Sexual Activity    Alcohol use: Never     Frequency: Never    Drug use: Never    Sexual activity: Not Currently   Lifestyle    Physical activity     Days per week: Not on file     Minutes per session: Not on file    Stress: Only a little   Relationships    Social connections     Talks on phone: More than three times a week     Gets together: More than three times a week     Attends Yarsanism service: More than 4 times per year     Active member of club or organization: No     Attends meetings of clubs or organizations: Never     Relationship status: Never    Other Topics Concern    Not on file   Social History Narrative     Not on file       Chief Complaint:   Chief Complaint   Patient presents with    Right Shoulder - Pain       History of present illness:  This is a 45-year-old female seen in consultation for Samia Turner.  Patient seen for bilateral shoulder pain.  Patient is left-hand dominant.  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  It has been bothering her for several weeks now and getting worse.  Starting to affect her ability to sleep at night.  She did some physical therapy and tried meloxicam 6 weeks ago and has not made any improvements.  She is concerned about possible rotator cuff tear.      Review of Systems:    Constitution: Negative for chills, fever, and sweats.  Negative for unexplained weight loss.    HENT:  Negative for headaches and blurry vision.    Cardiovascular:Negative for chest pain or irregular heart beat. Negative for hypertension.    Respiratory:  Negative for cough and shortness of breath.    Gastrointestinal: Negative for abdominal pain, heartburn, melena, nausea, and vomitting.    Genitourinary:  Negative bladder incontinence and dysuria.    Musculoskeletal:  See HPI    Neurological: Negative for numbness.    Psychiatric/Behavioral: Negative for depression.  The patient is not nervous/anxious.      Endocrine: Negative for polyuria    Hematologic/Lymphatic: Negative for bleeding problem.  Does not bruise/bleed easily.    Skin: Negative for poor would healing and rash      Physical Examination:    Vital Signs:    Vitals:    09/16/20 1108   Resp: 15       Body mass index is 21.19 kg/m².    This a well-developed, well nourished patient in no acute distress.  They are alert and oriented and cooperative to examination.  Pt. walks without an antalgic gait.      Examination of the right shoulder shows no rashes or erythema. There are no masses, ecchymosis, or atrophy. The patient has mild restriction in range of motion in forward flexion, external rotation, and internal rotation  to the mid T-spine. The patient has moderately positive impingement signs. - Gibson's test. - Speeds test. Nontender to palpation over a.c. joint. Normal stability anteriorly, posteriorly, and negative sulcus sign. Passive range of motion: Forward flexion of 160°, external rotation at 90° of 70°, internal rotation of 50°, and external rotation at 0° of 50°. 2+ radial pulse. Intact axillary, radial, median and ulnar sensation.  4+ out of 5 resisted forward flexion, external rotation, and negative lift off test.    Examination of the left shoulder shows no rashes or erythema. There are no masses, ecchymosis, or atrophy. The patient has full range of motion in forward flexion, external rotation, and internal rotation to the mid T-spine. The patient has - impingement signs. - Gibson's test. - Speeds test. Nontender to palpation over a.c. joint. Normal stability anteriorly, posteriorly, and negative sulcus sign. Passive range of motion: Forward flexion of 180°, external rotation at 90° of 90°, internal rotation of 50°, and external rotation at 0° of 50°. 2+ radial pulse. Intact axillary, radial, median and ulnar sensation. 5 out of 5 resisted forward flexion, external rotation, and negative lift off test.        X-rays:  X-rays of both shoulders are reviewed which show some mild to moderate glenohumeral arthritic change.  Also has some degenerative change around the greater tuberosity particularly on the right     Assessment::  Right rotator cuff syndrome with some mild underlying arthritis    Plan:  I reviewed the findings with her today.  I recommended getting an MRI to make sure she does not have a rotator cuff tear.  She has done physical therapy and tried NSAIDs without any improvement.  This note was created using [employer] voice recognition software that occasionally misinterpreted phrases or words.    Consult note is delivered via Epic messaging service.

## 2020-09-16 NOTE — PROGRESS NOTES
Physical Therapy Daily Treatment Note     Name: Pedro Pablo Bazan  Clinic Number: [id number]    Therapy Diagnosis:   Encounter Diagnoses   Name Primary?    Decreased strength of lower extremity     Limited joint range of motion (ROM)     Balance problem      Physician: Oneal Acosta MD    Visit Date: 9/16/2020    Physician: Oneal Acosta MD     Physician Orders: PT Eval and Treat   Medical Diagnosis from Referral:   G71.00 (ICD-10-CM) - Muscular dystrophy   R53.1 (ICD-10-CM) - Weakness   R26.81 (ICD-10-CM) - Gait instability   W19.XXXD (ICD-10-CM) - Fall, subsequent encounter      Evaluation Date: 8/31/2020  Authorization Period Expiration: 12/31/20  Plan of Care Expiration: 10/26/20  Visit # / Visits authorized: 5/ 20    Time In: 09:30 AM  Time Out: 10:15 AM  Total Billable Time: 45 minutes    Precautions: Standard, anemia, hx of muscular dystrophy    Subjective     Pt reports: that she did very well after last visit and had less neck discomfort. She also has had no incidents of unsteadiness or falls while at home. This morning she had severe R shoulder pain, which has improved after taking pain medication. She states this pain in the morning happens frequently. Pt remains very motivated with PT and is compliant with all requests.    She was compliant with home exercise program.  Response to previous treatment: Positive response with minimal soreness   Functional change: minimal    Pain: 5/10  Location: right shoulder    Objective     Pedro Pablo received therapeutic exercises to develop strength, endurance, ROM, flexibility, posture and core stabilization for 35 minutes including:    NuStep 8 min  Seated ball flexion stretch 3 min  Seated ball squeeze/hip adduction (YTB) 3 min  Seated hip abduction with RTB 3 min        Resisted rows with YTB, 3x10  Upper trap stretch 2 min  Levator scapulae stretch 2 min  Pulleys flexion/abduction 3 min each    Possible at next visit: short arc quad, shuttle,  progression of current therex, kinesiotaping.    Pedro Pablo received the following manual therapy techniques: Soft tissue Mobilization were applied to the: R upper trap and levator scapulae for 10 minutes, including:  IASTM.    Cold pack applied to R shoulder in supine position for 5 min      Home Exercises Provided and Patient Education Provided     Education provided:   - HEP review  - Safety while walking and while at home  - Post exercise soreness and management  - Use of modalities at home to manage shoulder pain    Written Home Exercises Provided: Patient instructed to cont prior HEP.  Exercises were reviewed and Pedro Pablo was able to demonstrate them prior to the end of the session.  Pedro Pablo demonstrated good  understanding of the education provided.     See EMR under Patient Instructions for exercises provided 9/2/2020.      Assessment     Pt had moderate discomfort in shoulder joint during ROM and mobility exercises. Pt was educated on performing these exercises in a pain free ROM. Following last visit, she notes a positive response to IASTM and reduced neck stiffness and pain. She presents with reduced soft tissue dysfunction and redness during IASTM today. Pulleys were added today to help increase right shoulder ROM and strength. She would benefit from continuance at this time.    Pedro Pablo is progressing well towards her goals.   Pt prognosis is Fair.   Pt will continue to benefit from skilled outpatient physical therapy to address the deficits listed in the problem list box on initial evaluation, provide pt/family education and to maximize pt's level of independence in the home and community environment.     Pt's spiritual, cultural and educational needs considered and pt agreeable to plan of care and goals.    Anticipated Barriers for therapy: Patient depends on mother or caregiver for transportation.     Goals:  Short Term Goals: 4 weeks  - Pt will demonstrate independence with prescribed HEP to  facilitate healing and improve outcome measures. (progressing, not met)  - Pt will increase RLE MMT values by at least a half grade to assist with activity tolerance and balance. (progressing, not met)  - Pt will increase R shoulder ROM by 10 degrees in abduction and flexion in order to assist with lifting objects. (progressing, not met)  - Pt will improve R hamstring flexibility by 10 degrees during 90/90 test. (progressing, not met)     Long Term Goals: 8 weeks   - Pt will achieve TUG score of 15 seconds or less independently to demonstrate improved independence. (progressing, not met)  - Pt will be able to demonstrate BLE gross MMT values of at least 4+ to assist with gait, balance and ADL participation. (progressing, not met)  - Pt will be able to tolerate overhead UE activities with pain 5/10 or less to assist with functional activities. (progressing, not met)    Plan     Continue with current POC.     Possible at next visit: short arc quad, shuttle, progression of current therex, kinesiotaping.    Rodo Neal, PT

## 2020-09-17 ENCOUNTER — PATIENT OUTREACH (OUTPATIENT)
Dept: ADMINISTRATIVE | Facility: OTHER | Age: 45
End: 2020-09-17

## 2020-09-17 NOTE — PROGRESS NOTES
Health Maintenance Due   Topic Date Due    TETANUS VACCINE  08/21/1992    Cervical Cancer Screening  08/21/1995    Mammogram  08/21/2014    Influenza Vaccine (1) 08/01/2020     Chart was reviewed for overdue Proactive [facility] Encounters (FRANCES) topics (CRS, Breast Cancer Screening, Eye exam)  Health Maintenance has been updated.  LINKS immunization registry triggered.  Immunizations were reconciled.

## 2020-09-18 ENCOUNTER — HOSPITAL ENCOUNTER (OUTPATIENT)
Dept: RADIOLOGY | Facility: HOSPITAL | Age: 45
Discharge: HOME | End: 2020-09-18
Attending: INTERNAL MEDICINE
Payer: COMMERCIAL

## 2020-09-18 ENCOUNTER — PATIENT OUTREACH (OUTPATIENT)
Dept: ADMINISTRATIVE | Facility: OTHER | Age: 45
End: 2020-09-18

## 2020-09-18 DIAGNOSIS — R60.0 LEG EDEMA, LEFT: ICD-10-CM

## 2020-09-18 PROCEDURE — 93971 US LOWER EXTREMITY VEINS LEFT: ICD-10-PCS | Mod: 26,LT,, | Performed by: RADIOLOGY

## 2020-09-18 PROCEDURE — 93971 EXTREMITY STUDY: CPT | Mod: 26,LT,, | Performed by: RADIOLOGY

## 2020-09-18 PROCEDURE — 93971 EXTREMITY STUDY: CPT | Mod: TC,PO,LT

## 2020-09-21 ENCOUNTER — CLINICAL SUPPORT (OUTPATIENT)
Dept: REHABILITATION | Facility: HOSPITAL | Age: 45
End: 2020-09-21
Payer: COMMERCIAL

## 2020-09-21 DIAGNOSIS — M25.60 LIMITED JOINT RANGE OF MOTION (ROM): ICD-10-CM

## 2020-09-21 DIAGNOSIS — R26.89 BALANCE PROBLEM: ICD-10-CM

## 2020-09-21 DIAGNOSIS — R29.898 DECREASED STRENGTH OF LOWER EXTREMITY: ICD-10-CM

## 2020-09-21 PROCEDURE — 97110 THERAPEUTIC EXERCISES: CPT | Mod: PN

## 2020-09-21 PROCEDURE — 97140 MANUAL THERAPY 1/> REGIONS: CPT | Mod: PN

## 2020-09-21 NOTE — PROGRESS NOTES
Physical Therapy Daily Treatment Note     Name: Pedro Pablo Bazan  Clinic Number: [id number]    Therapy Diagnosis:   Encounter Diagnoses   Name Primary?    Decreased strength of lower extremity     Limited joint range of motion (ROM)     Balance problem      Physician: Oneal Acosta MD    Visit Date: 9/21/2020    Physician: Oneal Acosta MD     Physician Orders: PT Eval and Treat   Medical Diagnosis from Referral:   G71.00 (ICD-10-CM) - Muscular dystrophy   R53.1 (ICD-10-CM) - Weakness   R26.81 (ICD-10-CM) - Gait instability   W19.XXXD (ICD-10-CM) - Fall, subsequent encounter      Evaluation Date: 8/31/2020  Authorization Period Expiration: 12/31/20  Plan of Care Expiration: 10/26/20  Visit # / Visits authorized: 6/ 20    Time In: 09:30 AM  Time Out: 10:15 AM  Total Billable Time: 45 minutes    Precautions: Standard, anemia, hx of muscular dystrophy    Subjective     Pt reports: improvement in pain levels this morning but this is partly due to her taking a muscle relaxer. She states that soreness has been minimal after recent PT treatments, even with progression. She continues to be highly motivated and compliant with all requests. Pt still, however, does not have an assistive device.    She was compliant with home exercise program.  Response to previous treatment: Positive response with minimal soreness   Functional change: increasing ADL tolerance    Pain: 2/10  Location: right shoulder    Objective     Pedro Pablo received therapeutic exercises to develop strength, endurance, ROM, flexibility, posture and core stabilization for 35 minutes including:    NuStep 8 min  Seated ball flexion stretch 3 min  Seated ball squeeze/hip adduction (YTB) 3 min  Seated hip abduction with RTB 3 min  Supine hamstring stretch with strap, bilateral 2 min each  Short arc quad 3 min  Resisted rows with RTB, 3x10    Pulleys flexion/abduction 3 min each  Assisted single leg stance 3x30 seconds    Possible at next visit:  dynamic balance, long arc quad, calf stretch    Pedro Pablo received the following manual therapy techniques: Kinesiotaping application to right shoulder with emphasis on postural correction for 10 minutes.     Hot pack applied to R shoulder in supine position for 5 min      Home Exercises Provided and Patient Education Provided     Education provided:   - HEP review  - Safety while walking and while at home  - Post exercise soreness and management  - Use of modalities at home to manage shoulder pain    Written Home Exercises Provided: Patient instructed to cont prior HEP.  Exercises were reviewed and Pedro Pablo was able to demonstrate them prior to the end of the session.  Pedro Pablo demonstrated good  understanding of the education provided.     See EMR under Patient Instructions for exercises provided 9/2/2020.      Assessment     Pt tolerates further therex progression including hamstring stretch with strap, increased theraband resistance, and the addition of single leg balance activity. Resistance was increased to increase shoulder/periscapular strength and stability. Hamstring stretch was modified to increase effectiveness and improve posture/gait. Single leg activity was added to challenge balance and reduce risk of falling. She would benefit from continuance at this time to address remaining deficits.      Pedro Pablo is progressing well towards her goals.   Pt prognosis is Fair.   Pt will continue to benefit from skilled outpatient physical therapy to address the deficits listed in the problem list box on initial evaluation, provide pt/family education and to maximize pt's level of independence in the home and community environment.     Pt's spiritual, cultural and educational needs considered and pt agreeable to plan of care and goals.    Anticipated Barriers for therapy: Patient depends on mother or caregiver for transportation.     Goals:  Short Term Goals: 4 weeks  - Pt will demonstrate independence with  prescribed HEP to facilitate healing and improve outcome measures. (met)  - Pt will increase RLE MMT values by at least a half grade to assist with activity tolerance and balance. (progressing, not met)  - Pt will increase R shoulder ROM by 10 degrees in abduction and flexion in order to assist with lifting objects. (progressing, not met)  - Pt will improve R hamstring flexibility by 10 degrees during 90/90 test. (progressing, not met)     Long Term Goals: 8 weeks   - Pt will achieve TUG score of 15 seconds or less independently to demonstrate improved independence. (progressing, not met)  - Pt will be able to demonstrate BLE gross MMT values of at least 4+ to assist with gait, balance and ADL participation. (progressing, not met)  - Pt will be able to tolerate overhead UE activities with pain 5/10 or less to assist with functional activities. (progressing, not met)    Plan     Continue with current POC.     Possible at next visit: dynamic balance, long arc quad, calf stretch    Rodo Neal, PT

## 2020-09-28 ENCOUNTER — CLINICAL SUPPORT (OUTPATIENT)
Dept: REHABILITATION | Facility: HOSPITAL | Age: 45
End: 2020-09-28
Payer: COMMERCIAL

## 2020-09-28 DIAGNOSIS — R29.898 DECREASED STRENGTH OF LOWER EXTREMITY: ICD-10-CM

## 2020-09-28 DIAGNOSIS — R26.89 BALANCE PROBLEM: ICD-10-CM

## 2020-09-28 DIAGNOSIS — M25.60 LIMITED JOINT RANGE OF MOTION (ROM): ICD-10-CM

## 2020-09-28 PROCEDURE — 97112 NEUROMUSCULAR REEDUCATION: CPT | Mod: PN

## 2020-09-28 PROCEDURE — 97110 THERAPEUTIC EXERCISES: CPT | Mod: PN

## 2020-09-28 NOTE — PROGRESS NOTES
Physical Therapy Daily Treatment Note     Name: Pedro Pablo Bazan  Clinic Number: [id number]    Therapy Diagnosis:   Encounter Diagnoses   Name Primary?    Decreased strength of lower extremity     Limited joint range of motion (ROM)     Balance problem      Physician: Oneal Acosta MD    Visit Date: 9/28/2020    Physician: Oneal Acosta MD     Physician Orders: PT Eval and Treat   Medical Diagnosis from Referral:   G71.00 (ICD-10-CM) - Muscular dystrophy   R53.1 (ICD-10-CM) - Weakness   R26.81 (ICD-10-CM) - Gait instability   W19.XXXD (ICD-10-CM) - Fall, subsequent encounter      Evaluation Date: 8/31/2020  Authorization Period Expiration: 12/31/20  Plan of Care Expiration: 10/26/20  Visit # / Visits authorized: 7/ 20    Time In: 09:30 AM  Time Out: 10:15 AM  Total Billable Time: 45 minutes    Precautions: Standard, anemia, hx of muscular dystrophy    Subjective     Pt reports: that's symptoms remain about the same today with minimal pain in shoulder. She has had no falls or unsteadiness at home.     She was compliant with home exercise program.  Response to previous treatment: Positive response with minimal soreness   Functional change: increasing ADL tolerance    Pain: 2/10  Location: right shoulder    Objective     Pedro Pablo participated in neuromuscular re-education activities to improve: Balance, Coordination and Posture for 10 minutes. The following activities were included:    Assisted single leg stance 5x30 seconds with perturbations  Standing on blue foam, both feet 5x30 seconds      Pedro Pablo received therapeutic exercises to develop strength, endurance, ROM, flexibility, posture and core stabilization for 30 minutes including:    NuStep 7 min  Seated ball flexion stretch 3 min  Seated ball squeeze/hip adduction 3 min  Seated hip abduction with RTB 3 min    Resisted rows with RTB, 3x10    Pulleys flexion/abduction 3 min each    Possible at next visit: dynamic balance, long arc quad, calf  stretch      Hot pack applied to R shoulder in supine position for 5 min      Home Exercises Provided and Patient Education Provided     Education provided:   - HEP review  - Safety while walking and while at home  - Post exercise soreness and management  - Use of modalities at home to manage shoulder pain    Written Home Exercises Provided: Patient instructed to cont prior HEP.  Exercises were reviewed and Pedro Pablo was able to demonstrate them prior to the end of the session.  Pedro Pablo demonstrated good  understanding of the education provided.     See EMR under Patient Instructions for exercises provided 9/2/2020.      Assessment     Pt did well today with therapy as we began more dynamic balance training. Pt did require some rest periods due to fatigue and difficulty maintaining balance but overall she was able to complete each exercise. She continues to benefit from prescribed therex and would benefit from further progression of PT at this time to achieve her goals.     Pedro Pablo is progressing well towards her goals.   Pt prognosis is Fair.   Pt will continue to benefit from skilled outpatient physical therapy to address the deficits listed in the problem list box on initial evaluation, provide pt/family education and to maximize pt's level of independence in the home and community environment.     Pt's spiritual, cultural and educational needs considered and pt agreeable to plan of care and goals.    Anticipated Barriers for therapy: Patient depends on mother or caregiver for transportation.     Goals:  Short Term Goals: 4 weeks  - Pt will demonstrate independence with prescribed HEP to facilitate healing and improve outcome measures. (met)  - Pt will increase RLE MMT values by at least a half grade to assist with activity tolerance and balance. (progressing, not met)  - Pt will increase R shoulder ROM by 10 degrees in abduction and flexion in order to assist with lifting objects. (progressing, not met)  - Pt  will improve R hamstring flexibility by 10 degrees during 90/90 test. (progressing, not met)     Long Term Goals: 8 weeks   - Pt will achieve TUG score of 15 seconds or less independently to demonstrate improved independence. (progressing, not met)  - Pt will be able to demonstrate BLE gross MMT values of at least 4+ to assist with gait, balance and ADL participation. (progressing, not met)  - Pt will be able to tolerate overhead UE activities with pain 5/10 or less to assist with functional activities. (progressing, not met)    Plan     Continue with current POC.     Possible at next visit: dynamic balance, long arc quad, calf stretch    Rodo Neal, PT

## 2020-09-29 ENCOUNTER — TELEPHONE (OUTPATIENT)
Dept: INFUSION THERAPY | Facility: HOSPITAL | Age: 45
End: 2020-09-29

## 2020-09-30 ENCOUNTER — CLINICAL SUPPORT (OUTPATIENT)
Dept: REHABILITATION | Facility: HOSPITAL | Age: 45
End: 2020-09-30
Payer: COMMERCIAL

## 2020-09-30 DIAGNOSIS — R26.89 BALANCE PROBLEM: ICD-10-CM

## 2020-09-30 DIAGNOSIS — R29.898 DECREASED STRENGTH OF LOWER EXTREMITY: ICD-10-CM

## 2020-09-30 DIAGNOSIS — M25.60 LIMITED JOINT RANGE OF MOTION (ROM): ICD-10-CM

## 2020-09-30 PROCEDURE — 97112 NEUROMUSCULAR REEDUCATION: CPT | Mod: PN

## 2020-09-30 PROCEDURE — 97110 THERAPEUTIC EXERCISES: CPT | Mod: PN

## 2020-09-30 PROCEDURE — 97140 MANUAL THERAPY 1/> REGIONS: CPT | Mod: PN

## 2020-09-30 NOTE — PROGRESS NOTES
Physical Therapy Daily Treatment Note     Name: Pedro Pablo Bazan  Clinic Number: [id number]    Therapy Diagnosis:   Encounter Diagnoses   Name Primary?    Decreased strength of lower extremity     Limited joint range of motion (ROM)     Balance problem      Physician: Oneal Acosta MD    Visit Date: 9/30/2020    Physician: Oneal Acosta MD     Physician Orders: PT Eval and Treat   Medical Diagnosis from Referral:   G71.00 (ICD-10-CM) - Muscular dystrophy   R53.1 (ICD-10-CM) - Weakness   R26.81 (ICD-10-CM) - Gait instability   W19.XXXD (ICD-10-CM) - Fall, subsequent encounter      Evaluation Date: 8/31/2020  Authorization Period Expiration: 12/31/20  Plan of Care Expiration: 10/26/20  Visit # / Visits authorized: 8/ 20    Time In: 09:30 AM  Time Out: 10:15 AM  Total Billable Time: 45 minutes    Precautions: Standard, anemia, hx of muscular dystrophy    Subjective     Pt reports: that she is doing well with her balance and lower extremity strength. Her main complaint at this time is shoulder pain that varies in intensity.     She was compliant with home exercise program.  Response to previous treatment: Positive response with minimal soreness   Functional change: increasing ADL tolerance, improved balance at home    Pain: 4/10  Location: right shoulder    Objective     Pedro Pablo participated in neuromuscular re-education activities to improve: Balance, Coordination and Posture for 10 minutes. The following activities were included:    Assisted single leg stance 5x30 seconds with perturbations  Standing on blue foam, both feet 5x30 seconds      Pedro Pablo received therapeutic exercises to develop strength, endurance, ROM, flexibility, posture and core stabilization for 15 minutes including:    NuStep 7 min  Seated ball flexion stretch 5 min          Pulleys flexion/abduction 3 min each    Possible at next visit: dynamic balance, long arc quad, calf stretch  Pedro Pablo received the following manual therapy  techniques: Joint mobilizations in supine position, STM in seated position to right shoulder 20 minutes       Hot pack applied to R shoulder in supine position for 10 min      Home Exercises Provided and Patient Education Provided     Education provided:   - HEP review  - Safety while walking and while at home  - Post exercise soreness and management  - Use of modalities at home to manage shoulder pain    Written Home Exercises Provided: Patient instructed to cont prior HEP.  Exercises were reviewed and Pedro Pablo was able to demonstrate them prior to the end of the session.  Pedro Pablo demonstrated good  understanding of the education provided.     See EMR under Patient Instructions for exercises provided 9/2/2020.      Assessment     Pt had positive response to PT treatment today. Her shoulder is the most limiting factor as of late in regards to pain and upper extremity ROM. We began light shoulder joint mobilizations today and STM to upper trap and deltoid muscles to alleviate tension and reduce stiffness/pain. She is also responding well to balance training, we were able to progress to eyes closed on compliant surface. We will monitor change in symptoms, progress as tolerated and continue with current PT POC.     Pedro Pablo is progressing well towards her goals.   Pt prognosis is Fair.   Pt will continue to benefit from skilled outpatient physical therapy to address the deficits listed in the problem list box on initial evaluation, provide pt/family education and to maximize pt's level of independence in the home and community environment.     Pt's spiritual, cultural and educational needs considered and pt agreeable to plan of care and goals.    Anticipated Barriers for therapy: Patient depends on mother or caregiver for transportation.     Goals:  Short Term Goals: 4 weeks  - Pt will demonstrate independence with prescribed HEP to facilitate healing and improve outcome measures. (met)  - Pt will increase RLE MMT  values by at least a half grade to assist with activity tolerance and balance. (met)  - Pt will increase R shoulder ROM by 10 degrees in abduction and flexion in order to assist with lifting objects. (progressing, not met)  - Pt will improve R hamstring flexibility by 10 degrees during 90/90 test. (progressing, not met)     Long Term Goals: 8 weeks   - Pt will achieve TUG score of 15 seconds or less independently to demonstrate improved independence. (progressing, not met)  - Pt will be able to demonstrate BLE gross MMT values of at least 4+ to assist with gait, balance and ADL participation. (progressing, not met)  - Pt will be able to tolerate overhead UE activities with pain 5/10 or less to assist with functional activities. (progressing, not met)    Plan     Continue with current POC.     Possible at next visit: dynamic balance, long arc quad, calf stretch    Rodo Neal, PT

## 2020-10-01 ENCOUNTER — OFFICE VISIT (OUTPATIENT)
Dept: FAMILY MEDICINE | Facility: CLINIC | Age: 45
End: 2020-10-01
Payer: COMMERCIAL

## 2020-10-01 VITALS
WEIGHT: 125.81 LBS | HEART RATE: 81 BPM | SYSTOLIC BLOOD PRESSURE: 145 MMHG | TEMPERATURE: 98 F | HEIGHT: 64 IN | BODY MASS INDEX: 21.48 KG/M2 | DIASTOLIC BLOOD PRESSURE: 79 MMHG

## 2020-10-01 DIAGNOSIS — I10 HYPERTENSION, ESSENTIAL: ICD-10-CM

## 2020-10-01 DIAGNOSIS — R26.89 BALANCE PROBLEM: ICD-10-CM

## 2020-10-01 DIAGNOSIS — R29.6 FREQUENT FALLS: ICD-10-CM

## 2020-10-01 DIAGNOSIS — M25.60 LIMITED JOINT RANGE OF MOTION (ROM): ICD-10-CM

## 2020-10-01 DIAGNOSIS — R26.81 GAIT INSTABILITY: ICD-10-CM

## 2020-10-01 DIAGNOSIS — N92.1 MENORRHAGIA WITH IRREGULAR CYCLE: ICD-10-CM

## 2020-10-01 DIAGNOSIS — R29.898 DECREASED STRENGTH OF LOWER EXTREMITY: ICD-10-CM

## 2020-10-01 DIAGNOSIS — R74.8 ELEVATED LIVER ENZYMES: ICD-10-CM

## 2020-10-01 DIAGNOSIS — G71.00 MUSCULAR DYSTROPHY: Primary | ICD-10-CM

## 2020-10-01 DIAGNOSIS — R53.1 WEAKNESS: ICD-10-CM

## 2020-10-01 PROCEDURE — 3008F BODY MASS INDEX DOCD: CPT | Mod: CPTII,S$GLB,, | Performed by: FAMILY MEDICINE

## 2020-10-01 PROCEDURE — 99999 PR PBB SHADOW E&M-EST. PATIENT-LVL IV: CPT | Mod: PBBFAC,,, | Performed by: FAMILY MEDICINE

## 2020-10-01 PROCEDURE — 99215 OFFICE O/P EST HI 40 MIN: CPT | Mod: S$GLB,,, | Performed by: FAMILY MEDICINE

## 2020-10-01 PROCEDURE — 99215 PR OFFICE/OUTPT VISIT, EST, LEVL V, 40-54 MIN: ICD-10-PCS | Mod: S$GLB,,, | Performed by: FAMILY MEDICINE

## 2020-10-01 PROCEDURE — 3008F PR BODY MASS INDEX (BMI) DOCUMENTED: ICD-10-PCS | Mod: CPTII,S$GLB,, | Performed by: FAMILY MEDICINE

## 2020-10-01 PROCEDURE — 99999 PR PBB SHADOW E&M-EST. PATIENT-LVL IV: ICD-10-PCS | Mod: PBBFAC,,, | Performed by: FAMILY MEDICINE

## 2020-10-01 RX ORDER — AMLODIPINE BESYLATE 5 MG/1
5 TABLET ORAL DAILY
Qty: 30 TABLET | Refills: 11 | Status: SHIPPED | OUTPATIENT
Start: 2020-10-01 | End: 2021-10-23 | Stop reason: SDUPTHER

## 2020-10-01 NOTE — PROGRESS NOTES
PLAN:      Problem List Items Addressed This Visit     Gait instability (Chronic)     OCTOBER 2020:  PATIENT ADVISED TO USE WALKER OR CANE ASSISTIVE DEVICE AT ALL TIMES.  CONTINUE PHYSICAL THERAPY.  CONTINUE FOLLOW-UP WITH NEUROLOGY AND PROCEED WITH MRI AS SCHEDULED.    PREVIOUS PLAN:  Start physical therapy, referral to occupational medicine for work restrictions.    Start using a cane to prevent falls.    Referral to Neurology is pending.    Fall precautions.  ER precautions.         Relevant Orders    WALKER FOR HOME USE    Weakness (Chronic)     IMPROVING.  CONTINUE PHYSICAL THERAPY AND NEUROLOGY FOLLOW-UP AS SCHEDULED.         Relevant Orders    WALKER FOR HOME USE    Muscular dystrophy - Primary (Chronic)     OCTOBER 2020:  PATIENT IS UNDERGOING EVALUATION BY NEUROLOGY.  WILL CONTINUE TO FOLLOW.  PATIENT DOING WELL WITH PHYSICAL THERAPY.    PREVIOUS PLAN:S ending of physical therapy, occupational medicine to evaluate the patient for work restrictions, neurology consult.         Relevant Orders    WALKER FOR HOME USE    Limited joint range of motion (ROM) (Chronic)    Relevant Orders    WALKER FOR HOME USE    Balance problem (Chronic)    Relevant Orders    WALKER FOR HOME USE    Frequent falls (Chronic)    Relevant Orders    WALKER FOR HOME USE    Elevated liver enzymes (Chronic)     RECHECK HEPATIC FUNCTION.  SET UP ULTRASOUND OF THE LIVER.         Hypertension, essential (Chronic)     START AMLODIPINE 5 MG DAILY.  FOLLOW-UP IN FOUR WEEKS FOR HYPERTENSION AND OTHER ISSUES.    Discussed hypertension disease course, DASH-diet and exercise.  Discussed medication regimen importance of treating high blood pressure.  Patient advised of risk of untreated blood pressure.    ER precautions were given for symptoms of hypertensive urgency and emergency.           Relevant Medications    amLODIPine (NORVASC) 5 MG tablet    Decreased strength of lower extremity      Other Visit Diagnoses     Menorrhagia with irregular  cycle        Relevant Orders    Ambulatory referral/consult to Obstetrics / Gynecology        Future Appointments     Date Provider Specialty Appt Notes    10/6/2020  Chemotherapy iv iron injectafer #2/siu/kb    10/12/2020  Radiology Muscular dystrophy [G71.00]    10/12/2020  Radiology Muscular dystrophy [G71.00]    10/16/2020  Lab no orders linked- tb     siu    10/23/2020 Arlene Rutledge NP Hematology and Oncology anemia follow up 6 weeks/kb    11/3/2020 Oneal Acosta MD Family Medicine 4 week f/u    11/23/2020 James Fu MD Neurology Muscular dystrophy [G71.00]  Frequent falls [    12/3/2020 Oneal Acosta MD Family Medicine 2MO FU           Medication List with Changes/Refills   New Medications    AMLODIPINE (NORVASC) 5 MG TABLET    Take 1 tablet (5 mg total) by mouth once daily.   Current Medications    ASPIRIN 81 MG CHEW    Take 81 mg by mouth once daily.    FERROUS SULFATE (IRON, FERROUS SULFATE,) 325 MG (65 MG IRON) TAB TABLET    Take 325 mg by mouth 2 (two) times daily.    MELOXICAM (MOBIC) 15 MG TABLET        TRAMADOL (ULTRAM) 50 MG TABLET    Take 1 tablet (50 mg total) by mouth every 8 (eight) hours as needed for Pain.   Discontinued Medications    IBUPROFEN (ADVIL,MOTRIN) 800 MG TABLET           Oneal Acosta M.D.     ==========================================================================  Subjective:      Patient ID: Pedro Pablo Bazan is a 45 y.o. female.  has a past medical history of Muscular dystrophy.     Chief Complaint: Follow-up and Gait Problem (FREQUENT FALLS)      Problem List Items Addressed This Visit     Gait instability (Chronic)    Overview     INITIAL HPI AUGUST 2020:  Patient is here to establish care with me today.  Patient reports that she has a history of muscular dystrophy, but there is no confirmation of this in the medical records.  Patient does report that she has been weak in her shoulders and hips in that she has been falling more recently.   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  Patient has not been using a cane.  She has not been physical therapy.  No imaging on the lower back or lower extremities.    OCTOBER 2020:  PATIENT HAS BEEN ATTENDING PHYSICAL THERAPY WITH SOME IMPROVEMENT.  PATIENT REQUESTING A WALKER TODAY.  PATIENT IS USING THE CANE SOME BUT SHE FEELS UNSTEADY AT TIMES.  PATIENT CONTINUES TO FALL WITH USING CANE.  NO RECENT HEAD TRAUMA.         Current Assessment & Plan     OCTOBER 2020:  PATIENT ADVISED TO USE WALKER OR CANE ASSISTIVE DEVICE AT ALL TIMES.  CONTINUE PHYSICAL THERAPY.  CONTINUE FOLLOW-UP WITH NEUROLOGY AND PROCEED WITH MRI AS SCHEDULED.    PREVIOUS PLAN:  Start physical therapy, referral to occupational medicine for work restrictions.    Start using a cane to prevent falls.    Referral to Neurology is pending.    Fall precautions.  ER precautions.         Weakness (Chronic)    Overview     INITIAL HPI:  Likely due to muscular dystrophy.  Referral to neurology is pending.  OCTOBER 2020:  PATIENT HAS ESTABLISHED CARE WITH PHYSICAL THERAPY AND NEUROLOGY.           Current Assessment & Plan     IMPROVING.  CONTINUE PHYSICAL THERAPY AND NEUROLOGY FOLLOW-UP AS SCHEDULED.         Muscular dystrophy - Primary (Chronic)    Overview     INITIAL HPI AUGUST 2020:  Patient is new to me she reports that she has had muscular dystrophy since a child.  Patient has not had many issues and is been working up until this point.  Patient is starting to get weakness in her shoulders and hips.  She has had several falls in the last year.  She does not use a cane or walker.  Patient has not been to physical therapy.  Patient does not see neurology.  OCTOBER 2020:  REVIEWED NEUROLOGY CONSULT NOTE.  MRI PENDING.         Current Assessment & Plan     OCTOBER 2020:  PATIENT IS UNDERGOING EVALUATION BY NEUROLOGY.  WILL CONTINUE TO FOLLOW.  PATIENT DOING WELL WITH PHYSICAL THERAPY.    PREVIOUS PLAN:S ending of physical therapy, occupational  medicine to evaluate the patient for work restrictions, neurology consult.         Limited joint range of motion (ROM) (Chronic)    Balance problem (Chronic)    Frequent falls (Chronic)    Elevated liver enzymes (Chronic)    Overview     NEW PROBLEM.  FOUND ON RECENT BLOOD WORK.    Lab Results   Component Value Date    ALT 56 (H) 08/28/2020    AST 43 (H) 08/28/2020    ALKPHOS 61 08/28/2020    BILITOT 0.3 08/28/2020            Current Assessment & Plan     RECHECK HEPATIC FUNCTION.  SET UP ULTRASOUND OF THE LIVER.         Hypertension, essential (Chronic)    Overview     CHRONIC.  OCTOBER 2020:  UNCONTROLLED TODAY.. BP Reviewed.  Compliant with BP medications. No SE reported.   + GENERALIZED WEAKNESS  (-) CP, SOB, palpitations, dizziness, lightheadedness, HA, arm numbness, tingling, syncope.  Creatinine   Date Value Ref Range Status   08/28/2020 0.8 0.5 - 1.4 mg/dL Final              Current Assessment & Plan     START AMLODIPINE 5 MG DAILY.  FOLLOW-UP IN FOUR WEEKS FOR HYPERTENSION AND OTHER ISSUES.    Discussed hypertension disease course, DASH-diet and exercise.  Discussed medication regimen importance of treating high blood pressure.  Patient advised of risk of untreated blood pressure.    ER precautions were given for symptoms of hypertensive urgency and emergency.           Decreased strength of lower extremity      Other Visit Diagnoses     Menorrhagia with irregular cycle               Past Medical History:  Past Medical History:   Diagnosis Date    Muscular dystrophy     per pt caregiver report at visit on 8/20/2020     History reviewed. No pertinent surgical history.  Review of patient's allergies indicates:  No Known Allergies  Medication List with Changes/Refills   New Medications    AMLODIPINE (NORVASC) 5 MG TABLET    Take 1 tablet (5 mg total) by mouth once daily.   Current Medications    ASPIRIN 81 MG CHEW    Take 81 mg by mouth once daily.    FERROUS SULFATE (IRON, FERROUS SULFATE,) 325 MG (65 MG  "IRON) TAB TABLET    Take 325 mg by mouth 2 (two) times daily.    MELOXICAM (MOBIC) 15 MG TABLET        TRAMADOL (ULTRAM) 50 MG TABLET    Take 1 tablet (50 mg total) by mouth every 8 (eight) hours as needed for Pain.   Discontinued Medications    IBUPROFEN (ADVIL,MOTRIN) 800 MG TABLET          Social History     Tobacco Use    Smoking status: Never Smoker    Smokeless tobacco: Never Used   Substance Use Topics    Alcohol use: Never     Frequency: Never      History reviewed. No pertinent family history.    I have reviewed the complete PMH, social history, surgical history, allergies and medications.  As well as family history.    Review of Systems   Constitutional: Positive for fatigue. Negative for activity change.   HENT: Negative for congestion and sinus pain.    Eyes: Negative for visual disturbance.   Respiratory: Negative for chest tightness and shortness of breath.    Cardiovascular: Negative for palpitations and leg swelling.   Gastrointestinal: Negative for abdominal pain, diarrhea and nausea.   Endocrine: Negative for polyuria.   Genitourinary: Negative for difficulty urinating and frequency.   Musculoskeletal: Positive for arthralgias, back pain, gait problem, myalgias and neck pain. Negative for joint swelling.   Skin: Negative for rash.   Neurological: Positive for speech difficulty and weakness (CHRONIC GENERALIZED DUE TO MUSCULAR DYSTROPHY). Negative for dizziness and headaches.   Psychiatric/Behavioral: Negative for agitation. The patient is not nervous/anxious.      Objective:   BP (!) 145/79   Pulse 81   Temp 98.1 °F (36.7 °C) (Temporal)   Ht 5' 4" (1.626 m)   Wt 57.1 kg (125 lb 12.8 oz)   LMP 09/17/2020   BMI 21.59 kg/m²   Physical Exam  Vitals signs and nursing note reviewed.   Constitutional:       General: She is not in acute distress.     Appearance: She is well-developed. She is cachectic. She is not ill-appearing, toxic-appearing or diaphoretic.   HENT:      Head: Normocephalic and " atraumatic.      Right Ear: Hearing and external ear normal.      Left Ear: Hearing and external ear normal.   Eyes:      General: Lids are normal. No visual field deficit.     Conjunctiva/sclera: Conjunctivae normal.      Pupils: Pupils are equal, round, and reactive to light.   Neck:      Musculoskeletal: Normal range of motion and neck supple.      Thyroid: No thyroid mass or thyromegaly.      Vascular: No carotid bruit.      Trachea: Trachea normal.   Cardiovascular:      Rate and Rhythm: Normal rate and regular rhythm.      Pulses: Normal pulses.      Heart sounds: Normal heart sounds.   Pulmonary:      Effort: Pulmonary effort is normal. No respiratory distress.      Breath sounds: Normal breath sounds. No wheezing.   Abdominal:      General: Bowel sounds are normal.      Palpations: Abdomen is soft.   Musculoskeletal:         General: Tenderness and deformity present.      Right shoulder: She exhibits decreased range of motion, tenderness, bony tenderness, crepitus, pain and spasm. She exhibits no swelling, no effusion, no deformity and no laceration.   Lymphadenopathy:      Cervical: No cervical adenopathy.   Skin:     General: Skin is warm and dry.      Capillary Refill: Capillary refill takes less than 2 seconds.      Coloration: Skin is not pale.   Neurological:      General: No focal deficit present.      Mental Status: She is alert and oriented to person, place, and time. Mental status is at baseline. She is not disoriented.      GCS: GCS eye subscore is 4. GCS verbal subscore is 5. GCS motor subscore is 6.      Cranial Nerves: No cranial nerve deficit or facial asymmetry.      Sensory: Sensation is intact. No sensory deficit.      Motor: Weakness and atrophy present. No tremor or seizure activity.      Coordination: Coordination is intact.      Gait: Gait abnormal (UNSTEADY WITH CANE).      Comments: Patient with bilateral upper extremity weakness in the shoulders and hips bilaterally.  PATIENT WITH  CHRONIC SPEECH IMPEDIMENT   Psychiatric:         Attention and Perception: Attention normal. She is attentive.         Mood and Affect: Mood normal. Mood is not anxious or depressed.         Speech: Speech is not rapid and pressured or slurred.         Behavior: Behavior normal. Behavior is not agitated, aggressive or hyperactive. Behavior is cooperative.         Thought Content: Thought content normal. Thought content is not paranoid or delusional. Thought content does not include homicidal or suicidal ideation. Thought content does not include homicidal or suicidal plan.         Cognition and Memory: Memory is not impaired.         Judgment: Judgment normal.         Assessment:     1. Muscular dystrophy    2. Hypertension, essential    3. Menorrhagia with irregular cycle    4. Balance problem    5. Limited joint range of motion (ROM)    6. Gait instability    7. Weakness    8. Elevated liver enzymes    9. Frequent falls    10. Decreased strength of lower extremity      MDM:   HIGH MEDICAL COMPLEXITY.  HIGH RISK.   I have SPENT GREATER THAN 60 MIN RELATED TO DIRECT PATIENT CARE INCLUDING REVIEWING and TO SUMMARIZED old records.  I have performed thorough medication reconciliation today and discussed risk and benefits of each medication.  I have reviewed labs and discussed with patient.  All questions were answered.  I am requesting old records and will review them once they are available.  PODIATRY, CARDIOLOGY, ORTHOPEDICS, PHYSICAL THERAPY,     I have signed for the following orders AND/OR meds.  Orders Placed This Encounter   Procedures    WALKER FOR HOME USE     Order Specific Question:   Type of Walker:     Answer:   Rollator with brakes and/or seat     Order Specific Question:   With wheels?     Answer:   Yes     Order Specific Question:   Height:     Answer:   64 INCHES     Order Specific Question:   Weight:     Answer:   125.8 LBS     Order Specific Question:   Length of need (1-99 months):     Answer:    99     Order Specific Question:   Please check all that apply:     Answer:   Walker will be used for gait training.    Ambulatory referral/consult to Obstetrics / Gynecology     Standing Status:   Future     Standing Expiration Date:   11/3/2021     Referral Priority:   Urgent     Referral Type:   Consultation     Referral Reason:   Specialty Services Required     Requested Specialty:   Obstetrics and Gynecology     Number of Visits Requested:   1     Medications Ordered This Encounter   Medications    amLODIPine (NORVASC) 5 MG tablet     Sig: Take 1 tablet (5 mg total) by mouth once daily.     Dispense:  30 tablet     Refill:  11        Follow up in about 4 weeks (around 10/29/2020), or if symptoms worsen or fail to improve, for MD FOLLOW UP.    If no improvement in symptoms or symptoms worsen, advised to call/follow-up at clinic or go to ER. Patient voiced understanding and all questions/concerns were addressed.     DISCLAIMER: This note was compiled by using a speech recognition dictation system and therefore please be aware that typographical / speech recognition errors can and do occur.  Please contact me if you see any errors specifically.    Oneal Acosta M.D.       Office: [phone] [address] [place] [address]  FAX: [phone]

## 2020-10-01 NOTE — Clinical Note
PLEASE MAKE SURE THAT PATIENT HAS HIGH RISK FOLLOW-UP APPOINTMENTS EVERY 4 TO 6 WEEKS FOR THE NEXT SIX MONTHS

## 2020-10-03 PROBLEM — M25.60 LIMITED JOINT RANGE OF MOTION (ROM): Chronic | Status: ACTIVE | Noted: 2020-09-01

## 2020-10-03 PROBLEM — R26.81 GAIT INSTABILITY: Chronic | Status: ACTIVE | Noted: 2020-08-30

## 2020-10-03 PROBLEM — R74.8 ELEVATED LIVER ENZYMES: Chronic | Status: ACTIVE | Noted: 2020-10-01

## 2020-10-03 PROBLEM — I10 HYPERTENSION, ESSENTIAL: Chronic | Status: ACTIVE | Noted: 2020-10-01

## 2020-10-03 PROBLEM — R53.1 WEAKNESS: Chronic | Status: ACTIVE | Noted: 2020-08-30

## 2020-10-03 PROBLEM — R26.89 BALANCE PROBLEM: Chronic | Status: ACTIVE | Noted: 2020-09-01

## 2020-10-03 PROBLEM — R29.6 FREQUENT FALLS: Chronic | Status: ACTIVE | Noted: 2020-10-01

## 2020-10-03 NOTE — ASSESSMENT & PLAN NOTE
START AMLODIPINE 5 MG DAILY.  FOLLOW-UP IN FOUR WEEKS FOR HYPERTENSION AND OTHER ISSUES.    Discussed hypertension disease course, DASH-diet and exercise.  Discussed medication regimen importance of treating high blood pressure.  Patient advised of risk of untreated blood pressure.    ER precautions were given for symptoms of hypertensive urgency and emergency.

## 2020-10-03 NOTE — ASSESSMENT & PLAN NOTE
OCTOBER 2020:  PATIENT ADVISED TO USE WALKER OR CANE ASSISTIVE DEVICE AT ALL TIMES.  CONTINUE PHYSICAL THERAPY.  CONTINUE FOLLOW-UP WITH NEUROLOGY AND PROCEED WITH MRI AS SCHEDULED.    PREVIOUS PLAN:  Start physical therapy, referral to occupational medicine for work restrictions.    Start using a cane to prevent falls.    Referral to Neurology is pending.    Fall precautions.  ER precautions.

## 2020-10-03 NOTE — ASSESSMENT & PLAN NOTE
OCTOBER 2020:  PATIENT IS UNDERGOING EVALUATION BY NEUROLOGY.  WILL CONTINUE TO FOLLOW.  PATIENT DOING WELL WITH PHYSICAL THERAPY.    PREVIOUS PLAN:S ending of physical therapy, occupational medicine to evaluate the patient for work restrictions, neurology consult.

## 2020-10-06 ENCOUNTER — PATIENT MESSAGE (OUTPATIENT)
Dept: ADMINISTRATIVE | Facility: HOSPITAL | Age: 45
End: 2020-10-06

## 2020-10-07 ENCOUNTER — PATIENT MESSAGE (OUTPATIENT)
Dept: INFUSION THERAPY | Facility: HOSPITAL | Age: 45
End: 2020-10-07

## 2020-10-08 ENCOUNTER — PATIENT MESSAGE (OUTPATIENT)
Dept: FAMILY MEDICINE | Facility: CLINIC | Age: 45
End: 2020-10-08

## 2020-10-16 ENCOUNTER — INFUSION (OUTPATIENT)
Dept: INFUSION THERAPY | Facility: HOSPITAL | Age: 45
End: 2020-10-16
Attending: INTERNAL MEDICINE
Payer: COMMERCIAL

## 2020-10-16 VITALS
OXYGEN SATURATION: 100 % | SYSTOLIC BLOOD PRESSURE: 120 MMHG | TEMPERATURE: 98 F | RESPIRATION RATE: 16 BRPM | HEART RATE: 74 BPM | DIASTOLIC BLOOD PRESSURE: 79 MMHG | HEIGHT: 64 IN | BODY MASS INDEX: 21.59 KG/M2

## 2020-10-16 DIAGNOSIS — D50.8 OTHER IRON DEFICIENCY ANEMIA: Primary | ICD-10-CM

## 2020-10-16 PROCEDURE — 25000003 PHARM REV CODE 250: Performed by: INTERNAL MEDICINE

## 2020-10-16 PROCEDURE — 96365 THER/PROPH/DIAG IV INF INIT: CPT

## 2020-10-16 PROCEDURE — 63600175 PHARM REV CODE 636 W HCPCS: Mod: JG | Performed by: INTERNAL MEDICINE

## 2020-10-16 RX ORDER — CYCLOBENZAPRINE HCL 10 MG
TABLET ORAL
Qty: 30 TABLET | Refills: 1 | Status: SHIPPED | OUTPATIENT
Start: 2020-10-16

## 2020-10-16 RX ORDER — SODIUM CHLORIDE 0.9 % (FLUSH) 0.9 %
10 SYRINGE (ML) INJECTION
Status: DISCONTINUED | OUTPATIENT
Start: 2020-10-16 | End: 2020-10-16 | Stop reason: HOSPADM

## 2020-10-16 RX ADMIN — FERRIC CARBOXYMALTOSE INJECTION 750 MG: 50 INJECTION, SOLUTION INTRAVENOUS at 11:10

## 2020-10-16 NOTE — DISCHARGE INSTRUCTIONS
[facility]  [address] [facility]  [address]  [phone] phone     [phone] fax  Hours of Operation: Monday- Friday 8:00am- 5:00pm  After hours phone  [phone]  Hematology / Oncology Physicians on call      Dr. Kvng Harper, SALOMON Wright NP Tyesha Taylor, NP    Please call with any concerns regarding your appointment today.      FALL PREVENTION   Falls often occur due to slipping, tripping or losing your balance. Here are ways to reduce your risk of falling again.    Was there anything that caused your fall that can be fixed, removed or replaced?    Make your home safe by keeping walkways clear of objects you may trip over.    Use non-slip pads under rugs.    Do not walk in poorly lit areas.    Do not stand on chairs or wobbly ladders.    Use caution when reaching overhead or looking upward. This position can cause a loss of balance.    Be sure your shoes fit properly, have non-slip bottoms and are in good condition.    Be cautious when going up and down stairs, curbs, and when walking on uneven sidewalks.    If your balance is poor, consider using a cane or walker.    If your fall was related to alcohol use, stop or limit alcohol intake.    If your fall was related to use of sleeping medicines, talk to your doctor about this. You may need to reduce your dosage at bedtime if you awaken during the night to go to the bathroom.    To reduce the need for nighttime bathroom trips:    Avoid drinking fluids for several hours before going to bed    Empty your bladder before going to bed    Men can keep a urinal at the bedside   © 3937-1906 [employer] [facility], [address] [place] [address], [place] [address]. All rights reserved. This information is not intended as a substitute for professional medical care. Always follow your healthcare professional's instructions.

## 2020-10-21 ENCOUNTER — TELEPHONE (OUTPATIENT)
Dept: ORTHOPEDICS | Facility: CLINIC | Age: 45
End: 2020-10-21

## 2020-10-21 NOTE — TELEPHONE ENCOUNTER
----- Message from Shady Zuniga MD sent at 10/21/2020 10:42 AM CDT -----  Results noted. Pt needs appt to discuss results and treatment options.

## 2020-10-21 NOTE — TELEPHONE ENCOUNTER
Called and left message for patient to return call to make an appointment to discuss MRI results with Dr. Zuniga.

## 2020-10-23 ENCOUNTER — TELEPHONE (OUTPATIENT)
Dept: HEMATOLOGY/ONCOLOGY | Facility: CLINIC | Age: 45
End: 2020-10-23

## 2020-10-23 ENCOUNTER — TELEPHONE (OUTPATIENT)
Dept: INFUSION THERAPY | Facility: HOSPITAL | Age: 45
End: 2020-10-23

## 2020-10-23 ENCOUNTER — TELEPHONE (OUTPATIENT)
Dept: ORTHOPEDICS | Facility: CLINIC | Age: 45
End: 2020-10-23

## 2020-10-23 NOTE — TELEPHONE ENCOUNTER
Called and left message for patient to return call to make an appointment for MRI results with Dr. Zuniga.

## 2020-11-03 ENCOUNTER — TELEPHONE (OUTPATIENT)
Dept: HEMATOLOGY/ONCOLOGY | Facility: CLINIC | Age: 45
End: 2020-11-03

## 2020-11-03 ENCOUNTER — TELEPHONE (OUTPATIENT)
Dept: FAMILY MEDICINE | Facility: CLINIC | Age: 45
End: 2020-11-03

## 2020-11-03 ENCOUNTER — OFFICE VISIT (OUTPATIENT)
Dept: FAMILY MEDICINE | Facility: CLINIC | Age: 45
End: 2020-11-03
Payer: COMMERCIAL

## 2020-11-03 VITALS
WEIGHT: 130.19 LBS | TEMPERATURE: 99 F | DIASTOLIC BLOOD PRESSURE: 88 MMHG | HEIGHT: 64 IN | SYSTOLIC BLOOD PRESSURE: 138 MMHG | HEART RATE: 82 BPM | BODY MASS INDEX: 22.23 KG/M2

## 2020-11-03 DIAGNOSIS — M25.511 CHRONIC RIGHT SHOULDER PAIN: ICD-10-CM

## 2020-11-03 DIAGNOSIS — G89.29 CHRONIC RIGHT SHOULDER PAIN: ICD-10-CM

## 2020-11-03 DIAGNOSIS — D50.8 OTHER IRON DEFICIENCY ANEMIA: Primary | Chronic | ICD-10-CM

## 2020-11-03 PROCEDURE — 3008F PR BODY MASS INDEX (BMI) DOCUMENTED: ICD-10-PCS | Mod: CPTII,S$GLB,, | Performed by: FAMILY MEDICINE

## 2020-11-03 PROCEDURE — 99213 OFFICE O/P EST LOW 20 MIN: CPT | Mod: S$GLB,,, | Performed by: FAMILY MEDICINE

## 2020-11-03 PROCEDURE — 99213 PR OFFICE/OUTPT VISIT, EST, LEVL III, 20-29 MIN: ICD-10-PCS | Mod: S$GLB,,, | Performed by: FAMILY MEDICINE

## 2020-11-03 PROCEDURE — 99999 PR PBB SHADOW E&M-EST. PATIENT-LVL IV: ICD-10-PCS | Mod: PBBFAC,,, | Performed by: FAMILY MEDICINE

## 2020-11-03 PROCEDURE — 99999 PR PBB SHADOW E&M-EST. PATIENT-LVL IV: CPT | Mod: PBBFAC,,, | Performed by: FAMILY MEDICINE

## 2020-11-03 PROCEDURE — 3008F BODY MASS INDEX DOCD: CPT | Mod: CPTII,S$GLB,, | Performed by: FAMILY MEDICINE

## 2020-11-03 RX ORDER — TRAMADOL HYDROCHLORIDE 50 MG/1
50 TABLET ORAL EVERY 8 HOURS PRN
Qty: 60 TABLET | Refills: 0 | Status: SHIPPED | OUTPATIENT
Start: 2020-11-03 | End: 2021-03-24

## 2020-11-03 RX ORDER — MELOXICAM 15 MG/1
TABLET ORAL
COMMUNITY
Start: 2020-10-13 | End: 2020-11-11

## 2020-11-03 NOTE — TELEPHONE ENCOUNTER
Called and notified patient of date and time of her appointment with Dr. Nga edmondson at [facility] 11/04/2020 at 2 pm.  Patient verbalized understanding of this.

## 2020-11-03 NOTE — ASSESSMENT & PLAN NOTE
Reviewed recent MRI with the patient.  Patient will make follow-up appointment with orthopedics soon to discuss treatment options.

## 2020-11-03 NOTE — PROGRESS NOTES
PLAN:      Problem List Items Addressed This Visit     Anemia - Primary (Chronic)     Follow-up with hematology as soon as possible for further evaluation and to set up further iron transfusions if needed.         Relevant Orders    Ambulatory referral/consult to Hematology / Oncology    Chronic right shoulder pain     Reviewed recent MRI with the patient.  Patient will make follow-up appointment with orthopedics soon to discuss treatment options.         Relevant Medications    traMADoL (ULTRAM) 50 mg tablet        Future Appointments     Date Provider Specialty Appt Notes    11/4/2020 Kirill Sylvester MD Hematology and Oncology anemia    11/23/2020 James Fu MD Neurology Muscular dystrophy [G71.00]  Frequent falls [    12/3/2020 Oneal Acosta MD Family Medicine 2MO FU    1/7/2021 Oneal Acosta MD Family Medicine 6 week f/u    2/25/2021 Oneal Acosta MD Family Medicine 6 week f/u    3/24/2021 Oneal Acosta MD Family Medicine 4 week f/u    4/28/2021 Oneal Acosta MD Family Medicine 4 week f/u    5/26/2021 Oneal Acosta MD Family Medicine 4 week f/u    6/30/2021 Oneal Acosta MD Family Medicine 4 week f/u           Medication List with Changes/Refills   Current Medications    AMLODIPINE (NORVASC) 5 MG TABLET    Take 1 tablet (5 mg total) by mouth once daily.    ASPIRIN 81 MG CHEW    Take 81 mg by mouth once daily.    CYCLOBENZAPRINE (FLEXERIL) 10 MG TABLET    TAKE 1 TABLET(10 MG) BY MOUTH THREE TIMES DAILY AS NEEDED    FERROUS SULFATE (IRON, FERROUS SULFATE,) 325 MG (65 MG IRON) TAB TABLET    Take 325 mg by mouth 2 (two) times daily.    IBUPROFEN (ADVIL,MOTRIN) 800 MG TABLET    TAKE 1 TABLET(800 MG) BY MOUTH THREE TIMES DAILY    MELOXICAM (MOBIC) 15 MG TABLET       Changed and/or Refilled Medications    Modified Medication Previous Medication    TRAMADOL (ULTRAM) 50 MG TABLET traMADoL (ULTRAM) 50 mg tablet       Take 1 tablet (50 mg total) by mouth every 8 (eight) hours as needed  for Pain.    Take 1 tablet (50 mg total) by mouth every 8 (eight) hours as needed for Pain.       Oneal Acosta M.D.     ==========================================================================  Subjective:      Patient ID: Pedro Pablo Bazan is a 45 y.o. female.  has a past medical history of Muscular dystrophy.     Chief Complaint: Follow-up      Problem List Items Addressed This Visit     Anemia - Primary (Chronic)    Overview     =================================================  NOVEMBER 2020:  Patient reports that she had one iron infusion.  Patient states that she missed another appointment.  She has not followed up with Hematology yet.  Patient states that she still gets fatigue easily.  ================================================= Chronic.  Patient with profound anemia.  Patient reports that she is taking iron in the past.  She denies any bloody stools or any other bleeding other than menstrual period.         Current Assessment & Plan     Follow-up with hematology as soon as possible for further evaluation and to set up further iron transfusions if needed.         Chronic right shoulder pain    Overview     MRI Shoulder Without Contrast Right  Narrative: EXAMINATION:  MRI SHOULDER WITHOUT CONTRAST RIGHT    CLINICAL HISTORY:  Severe pain involving the right shoulder with limited range of motion.    TECHNIQUE:  Multiplanar, multisequence noncontrast MRI of the right shoulder was obtained.    COMPARISON:  Right shoulder x-rays dated 08/12/2020.  No prior MRI comparison studies are available.    FINDINGS:  There appears to be evidence of supraspinatus tendinosis with likely reactive mild edema within the greater tuberosity..  There appears to be evidence of partial-thickness bursal surface tearing of the central to posterior 3rd supraspinatus tendon at the insertional footplate.  There appears to be mild anterior infraspinatus tendinosis.  The teres minor tendon appears intact.  There is  evidence of partial-thickness articular surface tearing and interstitial tearing involving the superior to central 3rd subscapularis tendon.  There may also be partial-thickness bursal surface tearing of the superior 3rd subscapularis tendon.  There is background subscapularis tendinosis.  No significant asymmetrical rotator cuff muscle atrophy is seen.  No significant subacromial-subdeltoid bursal fluid is visualized.    The long head biceps tendon appears to be intact and normally located within the bicipital groove.  No significant glenohumeral joint effusion is seen.  The assessment of the glenoid labrum is limited in the absence of intracapsular contrast and joint distention.  Within these limitations, there is suspected fraying and degenerative tearing involving the superior labrum.  There is linear T2 signal intensity along the posterior-superior labral-glenoid junction which could reflect labral-cartilaginous separation.  This could be further assessed with shoulder arthrogram.  Mild to moderate hypertrophic AC joint arthritic changes are seen with contact of the bursal surface supraspinatus myotendinous junction.  No suspicious bone marrow edema suggestive of acute fracture is visualized.  Impression: 1. Supraspinatus tendinosis is seen with evidence of partial-thickness bursal surface tearing of the central to posterior 3rd supraspinatus tendon at the insertional footplate.  2. Mild anterior 3rd infraspinatus tendinosis is seen.  3. There appears to be partial-thickness articular surface and interstitial tearing involving the superior to central 3rd subscapularis tendon with suspected partial-thickness bursal surface tearing along the superior 3rd subscapularis tendon.  There is background subscapularis tendinosis.  4. There is suspected fraying and degenerative tearing involving the superior labrum.  There is linear T2 signal intensity along the posterior-superior labral-glenoid junction which could  reflect possible labral-cartilaginous separation.  This could be further assessed with shoulder arthrogram as warranted.  5. Mild to moderate hypertrophic AC joint arthritic changes are seen.  6. Additional findings and details as above.    Electronically signed by: Ned Bar MD  Date:    10/21/2020  Time:    10:26         Current Assessment & Plan     Reviewed recent MRI with the patient.  Patient will make follow-up appointment with orthopedics soon to discuss treatment options.                Past Medical History:  Past Medical History:   Diagnosis Date    Muscular dystrophy     per pt caregiver report at visit on 8/20/2020     History reviewed. No pertinent surgical history.  Review of patient's allergies indicates:  No Known Allergies  Medication List with Changes/Refills   Current Medications    AMLODIPINE (NORVASC) 5 MG TABLET    Take 1 tablet (5 mg total) by mouth once daily.    ASPIRIN 81 MG CHEW    Take 81 mg by mouth once daily.    CYCLOBENZAPRINE (FLEXERIL) 10 MG TABLET    TAKE 1 TABLET(10 MG) BY MOUTH THREE TIMES DAILY AS NEEDED    FERROUS SULFATE (IRON, FERROUS SULFATE,) 325 MG (65 MG IRON) TAB TABLET    Take 325 mg by mouth 2 (two) times daily.    IBUPROFEN (ADVIL,MOTRIN) 800 MG TABLET    TAKE 1 TABLET(800 MG) BY MOUTH THREE TIMES DAILY    MELOXICAM (MOBIC) 15 MG TABLET       Changed and/or Refilled Medications    Modified Medication Previous Medication    TRAMADOL (ULTRAM) 50 MG TABLET traMADoL (ULTRAM) 50 mg tablet       Take 1 tablet (50 mg total) by mouth every 8 (eight) hours as needed for Pain.    Take 1 tablet (50 mg total) by mouth every 8 (eight) hours as needed for Pain.      Social History     Tobacco Use    Smoking status: Never Smoker    Smokeless tobacco: Never Used   Substance Use Topics    Alcohol use: Never     Frequency: Never      History reviewed. No pertinent family history.    I have reviewed the complete PMH, social history, surgical history, allergies and medications.   "As well as family history.    Review of Systems see HPI otherwise negative  Objective:   /88   Pulse 82   Temp 98.6 °F (37 °C) (Oral)   Ht 5' 4" (1.626 m)   Wt 59.1 kg (130 lb 3.2 oz)   LMP 10/16/2020   BMI 22.35 kg/m²   Physical Exam  Vitals signs and nursing note reviewed.   Constitutional:       General: She is not in acute distress.     Appearance: She is well-developed. She is ill-appearing (Chronically).   HENT:      Head: Normocephalic and atraumatic.      Right Ear: External ear normal.      Left Ear: External ear normal.      Nose: Nose normal. No rhinorrhea.   Eyes:      Extraocular Movements: Extraocular movements intact.      Pupils: Pupils are equal, round, and reactive to light.   Neck:      Musculoskeletal: Normal range of motion and neck supple.   Cardiovascular:      Rate and Rhythm: Normal rate.      Pulses: Normal pulses.   Pulmonary:      Effort: Pulmonary effort is normal. No respiratory distress.      Breath sounds: Normal breath sounds.   Musculoskeletal:         General: Tenderness and deformity present.   Skin:     General: Skin is warm and dry.      Capillary Refill: Capillary refill takes less than 2 seconds.   Neurological:      General: No focal deficit present.      Mental Status: She is alert and oriented to person, place, and time.      Gait: Gait abnormal.   Psychiatric:         Mood and Affect: Mood normal.         Behavior: Behavior normal.         Assessment:     1. Other iron deficiency anemia    2. Chronic right shoulder pain      MDM:   Moderate complexity.  Moderate risk.  I have Reviewed and summarized old records.  I have performed thorough medication reconciliation today and discussed risk and benefits of each medication.  I have reviewed imaging, labs and discussed with patient.  All questions were answered.  I am requesting old records and will review them once they are available.  Orthopedics, Hematology    I have signed for the following orders AND/OR " meds.  Orders Placed This Encounter   Procedures    Ambulatory referral/consult to Hematology / Oncology     Standing Status:   Future     Standing Expiration Date:   12/3/2021     Referral Priority:   Routine     Referral Type:   Consultation     Referral Reason:   Specialty Services Required     Requested Specialty:   Hematology and Oncology     Medications Ordered This Encounter   Medications    traMADoL (ULTRAM) 50 mg tablet     Sig: Take 1 tablet (50 mg total) by mouth every 8 (eight) hours as needed for Pain.     Dispense:  60 tablet     Refill:  0     Quantity prescribed more than 7 day supply? Yes medically necessary        Follow up in about 4 weeks (around 12/1/2020), or if symptoms worsen or fail to improve, for Med refills, As scheduled.    If no improvement in symptoms or symptoms worsen, advised to call/follow-up at clinic or go to ER. Patient voiced understanding and all questions/concerns were addressed.     DISCLAIMER: This note was compiled by using a speech recognition dictation system and therefore please be aware that typographical / speech recognition errors can and do occur.  Please contact me if you see any errors specifically.    Oneal Acosta M.D.       Office: [phone] [address] [place] [address]  FAX: [phone]

## 2020-11-03 NOTE — PATIENT INSTRUCTIONS
Follow up in about 4 weeks (around 12/1/2020), or if symptoms worsen or fail to improve, for Med refills, As scheduled.     If no improvement in symptoms or symptoms worsen, please be advised to call MD, follow-up at clinic and/or go to ER if becomes severe.    Oneal Acosta M.D.        We Offer TELEHEALTH & Same Day Appointments!   Book your Telehealth appointment with me through my nurse or   Clinic appointments on [facility]!    [address] [place] [address]    Office: [phone]   FAX: [phone]    Check out my Facebook Page and Follow Me at: [email or web address]    Check out my website at [email or web address] by clicking on: [email or web address]    To Schedule appointments online, go to [facility]: [email or web address]

## 2020-11-03 NOTE — ASSESSMENT & PLAN NOTE
Follow-up with hematology as soon as possible for further evaluation and to set up further iron transfusions if needed.

## 2020-11-03 NOTE — PROGRESS NOTES
Staff messages sent to Dr. Sylvester and Dr. Zuniga's staff to assist in making patient follow up appointments per Dr. Acosta's orders.

## 2020-11-04 ENCOUNTER — TELEPHONE (OUTPATIENT)
Dept: ORTHOPEDICS | Facility: CLINIC | Age: 45
End: 2020-11-04

## 2020-11-04 ENCOUNTER — OFFICE VISIT (OUTPATIENT)
Dept: HEMATOLOGY/ONCOLOGY | Facility: CLINIC | Age: 45
End: 2020-11-04
Payer: COMMERCIAL

## 2020-11-04 VITALS
SYSTOLIC BLOOD PRESSURE: 144 MMHG | RESPIRATION RATE: 18 BRPM | HEART RATE: 78 BPM | OXYGEN SATURATION: 98 % | BODY MASS INDEX: 22.02 KG/M2 | WEIGHT: 129 LBS | DIASTOLIC BLOOD PRESSURE: 83 MMHG | HEIGHT: 64 IN

## 2020-11-04 DIAGNOSIS — D50.8 OTHER IRON DEFICIENCY ANEMIA: Primary | Chronic | ICD-10-CM

## 2020-11-04 PROCEDURE — 99213 PR OFFICE/OUTPT VISIT, EST, LEVL III, 20-29 MIN: ICD-10-PCS | Mod: S$GLB,,, | Performed by: INTERNAL MEDICINE

## 2020-11-04 PROCEDURE — 3008F PR BODY MASS INDEX (BMI) DOCUMENTED: ICD-10-PCS | Mod: CPTII,S$GLB,, | Performed by: INTERNAL MEDICINE

## 2020-11-04 PROCEDURE — 99999 PR PBB SHADOW E&M-EST. PATIENT-LVL III: CPT | Mod: PBBFAC,,, | Performed by: INTERNAL MEDICINE

## 2020-11-04 PROCEDURE — 3008F BODY MASS INDEX DOCD: CPT | Mod: CPTII,S$GLB,, | Performed by: INTERNAL MEDICINE

## 2020-11-04 PROCEDURE — 99213 OFFICE O/P EST LOW 20 MIN: CPT | Mod: S$GLB,,, | Performed by: INTERNAL MEDICINE

## 2020-11-04 PROCEDURE — 99999 PR PBB SHADOW E&M-EST. PATIENT-LVL III: ICD-10-PCS | Mod: PBBFAC,,, | Performed by: INTERNAL MEDICINE

## 2020-11-04 RX ORDER — IRON,CARBONYL/ASCORBIC ACID 100-250 MG
1 TABLET ORAL DAILY
Qty: 90 TABLET | Refills: 1 | Status: SHIPPED | OUTPATIENT
Start: 2020-11-04

## 2020-11-04 NOTE — PROGRESS NOTES
Subjective:       Patient ID: Pedro Pablo Bazan is a 45 y.o. female.    Chief Complaint: Other iron deficiency anemia [D50.8]  HPI: We have an opportunity to see Ms. Pedro Pablo Bazan in Hematology Oncology clinic at [facility] on 11/03/2020.  Ms. Pedro Pablo Bazan is a 45 y.o. woman presents for evaluation of anemia.  Has menstruation.  Denies any bleeding in urine or stool.  Has fatigue, denies SOB, CP.  Was found to be iron deficient, had iv injectafer x 1.  Tolerated well.    Oncology History    No history exists.     Past Medical History:   Diagnosis Date    Muscular dystrophy     per pt caregiver report at visit on 8/20/2020     No family history on file.  Social History     Socioeconomic History    Marital status: Single     Spouse name: Not on file    Number of children: Not on file    Years of education: Not on file    Highest education level: Not on file   Occupational History    Not on file   Social Needs    Financial resource strain: Not very hard    Food insecurity     Worry: Never true     Inability: Never true    Transportation needs     Medical: No     Non-medical: No   Tobacco Use    Smoking status: Never Smoker    Smokeless tobacco: Never Used   Substance and Sexual Activity    Alcohol use: Never     Frequency: Never    Drug use: Never    Sexual activity: Not Currently   Lifestyle    Physical activity     Days per week: Not on file     Minutes per session: Not on file    Stress: Only a little   Relationships    Social connections     Talks on phone: More than three times a week     Gets together: More than three times a week     Attends Gnosticism service: More than 4 times per year     Active member of club or organization: No     Attends meetings of clubs or organizations: Never     Relationship status: Never    Other Topics Concern    Not on file   Social History Narrative    Not on file     No past surgical history on file.  Current Outpatient  Medications   Medication Sig Dispense Refill    amLODIPine (NORVASC) 5 MG tablet Take 1 tablet (5 mg total) by mouth once daily. 30 tablet 11    aspirin 81 MG Chew Take 81 mg by mouth once daily.      cyclobenzaprine (FLEXERIL) 10 MG tablet TAKE 1 TABLET(10 MG) BY MOUTH THREE TIMES DAILY AS NEEDED 30 tablet 1    ferrous sulfate (IRON, FERROUS SULFATE,) 325 mg (65 mg iron) Tab tablet Take 325 mg by mouth 2 (two) times daily.      ibuprofen (ADVIL,MOTRIN) 800 MG tablet TAKE 1 TABLET(800 MG) BY MOUTH THREE TIMES DAILY 90 tablet 1    meloxicam (MOBIC) 15 MG tablet       traMADoL (ULTRAM) 50 mg tablet Take 1 tablet (50 mg total) by mouth every 8 (eight) hours as needed for Pain. 60 tablet 0     No current facility-administered medications for this visit.        Labs:  Lab Results   Component Value Date    WBC 7.12 09/11/2020    HGB 8.8 (L) 09/11/2020    HCT 33.0 (L) 09/11/2020    MCV 66 (L) 09/11/2020     (H) 09/11/2020     BMP  Lab Results   Component Value Date     08/28/2020    K 4.7 08/28/2020     08/28/2020    CO2 24 08/28/2020    BUN 9 08/28/2020    CREATININE 0.8 08/28/2020    CALCIUM 9.0 08/28/2020    ANIONGAP 10 08/28/2020    ESTGFRAFRICA >60.0 08/28/2020    EGFRNONAA >60.0 08/28/2020     Lab Results   Component Value Date    ALT 56 (H) 08/28/2020    AST 43 (H) 08/28/2020    ALKPHOS 61 08/28/2020    BILITOT 0.3 08/28/2020       Lab Results   Component Value Date    IRON 10 (L) 08/28/2020    TIBC 630 (H) 08/28/2020    FERRITIN 11 (L) 08/28/2020     Lab Results   Component Value Date    OFODXZAG49 786 09/11/2020     Lab Results   Component Value Date    FOLATE 11.8 09/11/2020     Lab Results   Component Value Date    TSH 0.776 09/11/2020       I have reviewed the radiology reports and examined the scan/xray images.    Review of Systems   Constitutional: Negative.    HENT: Negative.    Eyes: Negative.    Respiratory: Negative.    Cardiovascular: Negative.    Gastrointestinal: Negative.     Endocrine: Negative.    Genitourinary: Negative.    Musculoskeletal: Negative.    Skin: Negative.    Allergic/Immunologic: Negative.    Neurological: Negative.    Hematological: Negative.    Psychiatric/Behavioral: Negative.      ECOG SCORE    0 - Fully active-able to carry on all pre-disease performance without restriction            Objective:     Vitals:    11/04/20 1359   BP: (!) 144/83   Pulse: 78   Resp: 18   Body mass index is 22.14 kg/m².  Physical Exam  Vitals signs and nursing note reviewed.   Constitutional:       Appearance: She is well-developed.   HENT:      Head: Normocephalic and atraumatic.   Eyes:      Conjunctiva/sclera: Conjunctivae normal.   Neck:      Musculoskeletal: Normal range of motion and neck supple.   Cardiovascular:      Rate and Rhythm: Normal rate and regular rhythm.   Pulmonary:      Effort: Pulmonary effort is normal.      Breath sounds: Normal breath sounds.   Abdominal:      General: Bowel sounds are normal.      Palpations: Abdomen is soft.   Musculoskeletal: Normal range of motion.   Skin:     General: Skin is warm and dry.   Neurological:      Mental Status: She is alert and oriented to person, place, and time.   Psychiatric:         Behavior: Behavior normal.         Thought Content: Thought content normal.         Judgment: Judgment normal.           Assessment:      1. Other iron deficiency anemia           Plan:     Other iron deficiency anemia  -     iron-vitamin C 100-250 mg, ICAR-C, (ICAR-C) 100-250 mg Tab; Take 1 tablet by mouth once daily.  Dispense: 90 tablet; Refill: 1    Will need 2nd dose of iv injectafer.    RTC in 6 weeks.

## 2020-11-04 NOTE — LETTER
November 4, 2020      Oneal Acosta MD  [address]  [place] [address]           [place] [facility] Hematology Oncology  [address] [place] [address]  Phone: [phone]          Patient: Pedro Pablo Bazan   MR Number: [id number]   YOB: 1975   Date of Visit: 11/4/2020       Dear Dr. Oneal Acosta:    Thank you for referring Pedro Pablo Bazan to me for evaluation. Attached you will find relevant portions of my assessment and plan of care.    If you have questions, please do not hesitate to call me. I look forward to following Pedro Pablo Bazan along with you.    Sincerely,    Kirill Sylvester MD    Enclosure  CC:  No Recipients    If you would like to receive this communication electronically, please contact [email or web address] or [phone] to request more information on Clinical Data Link access.    For providers and/or their staff who would like to refer a patient to [facility], please contact us through our one-stop-shop provider referral line, [facility] , at [phone].    If you feel you have received this communication in error or would no longer like to receive these types of communications, please e-mail [email or web address]

## 2020-11-04 NOTE — TELEPHONE ENCOUNTER
----- Message from Syl Fish LPN sent at 11/3/2020  1:55 PM CST -----  Hey can you please help this patient get a follow up appointment with Dr. Zuniga for her results per Dr. Acosta's request.    Pedro Pablo Bazan  MRN # [id number]  Date of birth 1975    Any and all help is greatly appreciated.  LANA Russo MD

## 2020-11-04 NOTE — TELEPHONE ENCOUNTER
Called patient twice with no answer. Left message to call back to make an appointment for MRI results.

## 2020-11-18 ENCOUNTER — PATIENT OUTREACH (OUTPATIENT)
Dept: ADMINISTRATIVE | Facility: OTHER | Age: 45
End: 2020-11-18

## 2020-11-18 NOTE — PROGRESS NOTES
Bedside report received from [address] [place] [address]. Orders reviewed. Pt sleeping in Open Crib. No acute distress noted. C/R monitor in place with alarms set per protocol. Will continue to monitor. Updates were requested from care everywhere.  Chart was reviewed for overdue Proactive [facility] Encounters (FRANCES) topics (CRS, Breast Cancer Screening, Eye exam)  Health Maintenance has been updated.  LINKS not responding.

## 2020-11-23 ENCOUNTER — PATIENT MESSAGE (OUTPATIENT)
Dept: FAMILY MEDICINE | Facility: CLINIC | Age: 45
End: 2020-11-23

## 2020-11-25 ENCOUNTER — INFUSION (OUTPATIENT)
Dept: INFUSION THERAPY | Facility: HOSPITAL | Age: 45
End: 2020-11-25
Attending: INTERNAL MEDICINE
Payer: COMMERCIAL

## 2020-11-25 VITALS
DIASTOLIC BLOOD PRESSURE: 77 MMHG | SYSTOLIC BLOOD PRESSURE: 107 MMHG | RESPIRATION RATE: 16 BRPM | WEIGHT: 128.75 LBS | OXYGEN SATURATION: 98 % | HEART RATE: 57 BPM | BODY MASS INDEX: 21.98 KG/M2 | TEMPERATURE: 99 F | HEIGHT: 64 IN

## 2020-11-25 DIAGNOSIS — D50.8 OTHER IRON DEFICIENCY ANEMIA: Primary | ICD-10-CM

## 2020-11-25 PROCEDURE — 96365 THER/PROPH/DIAG IV INF INIT: CPT

## 2020-11-25 PROCEDURE — A4216 STERILE WATER/SALINE, 10 ML: HCPCS | Performed by: INTERNAL MEDICINE

## 2020-11-25 PROCEDURE — 25000003 PHARM REV CODE 250: Performed by: INTERNAL MEDICINE

## 2020-11-25 PROCEDURE — 63600175 PHARM REV CODE 636 W HCPCS: Mod: JG | Performed by: INTERNAL MEDICINE

## 2020-11-25 RX ORDER — SODIUM CHLORIDE 0.9 % (FLUSH) 0.9 %
10 SYRINGE (ML) INJECTION
Status: DISCONTINUED | OUTPATIENT
Start: 2020-11-25 | End: 2020-11-25 | Stop reason: HOSPADM

## 2020-11-25 RX ADMIN — FERRIC CARBOXYMALTOSE INJECTION 750 MG: 50 INJECTION, SOLUTION INTRAVENOUS at 01:11

## 2020-11-25 RX ADMIN — Medication 10 ML: at 01:11

## 2020-11-25 RX ADMIN — SODIUM CHLORIDE: 9 INJECTION, SOLUTION INTRAVENOUS at 01:11

## 2020-11-25 NOTE — DISCHARGE INSTRUCTIONS
[facility]  [address]  [address]  [phone] phone     [phone] fax  Hours of Operation: Monday- Friday 8:00am- 5:00pm  After hours phone  [phone]  Hematology / Oncology Physicians on call      Dr. Kvng Harper, SALOMON Wright, SALOMON Caldera NP    Please call with any concerns regarding your appointment today.  WAYS TO HELP PREVENT INFECTION         WASH YOUR HANDS OFTEN DURING THE DAY, ESPECIALLY BEFORE YOU EAT, AFTER USING THE BATHROOM, AND AFTER TOUCHING ANIMALS     STAY AWAY FROM PEOPLE WHO HAVE ILLNESSES YOU CAN CATCH; SUCH AS COLDS, FLU, CHICKEN POX     TRY TO AVOID CROWDS     STAY AWAY FROM CHILDREN WHO RECENTLY HAVE RECEIVED LIVE VIRUS VACCINES     MAINTAIN GOOD MOUTH CARE     DO NOT SQUEEZE OR SCRATCH PIMPLES     CLEAN CUTS & SCRAPES RIGHT AWAY AND DAILY UNTIL HEALED WITH WARM WATER, SOAP & AN ANTISEPTIC     AVOID CONTACT WITH LITTER BOXES, BIRD CAGES, & FISH TANKS     AVOID STANDING WATER, IE., BIRD BATHS, FLOWER POTS/VASES, OR HUMIDIFIERS     WEAR GLOVES WHEN GARDENING OR CLEANING UP AFTER OTHERS, ESPECIALLY BABIES & SMALL CHILDREN     DO NOT EAT RAW FISH, SEAFOOD, MEAT, OR EGGS  FALL PREVENTION   Falls often occur due to slipping, tripping or losing your balance. Here are ways to reduce your risk of falling again.   Was there anything that caused your fall that can be fixed, removed or replaced?   Make your home safe by keeping walkways clear of objects you may trip over.   Use non-slip pads under rugs.   Do not walk in poorly lit areas.   Do not stand on chairs or wobbly ladders.   Use caution when reaching overhead or looking upward. This position can cause a loss of balance.   Be sure your shoes fit properly, have non-slip bottoms and are in good condition.   Be cautious when going up and down stairs, curbs, and when walking on  uneven sidewalks.   If your balance is poor, consider using a cane or walker.   If your fall was related to alcohol use, stop or limit alcohol intake.   If your fall was related to use of sleeping medicines, talk to your doctor about this. You may need to reduce your dosage at bedtime if you awaken during the night to go to the bathroom.   To reduce the need for nighttime bathroom trips:   Avoid drinking fluids for several hours before going to bed   Empty your bladder before going to bed   Men can keep a urinal at the bedside   © 0488-3041 [employer] [facility] [employer] [place], [address] [place] [address], [place] [address]. All rights reserved. This information is not intended as a substitute for professional medical care. Always follow your healthcare professional's instructions.

## 2020-11-25 NOTE — PLAN OF CARE
Problem: Adult Inpatient Plan of Care  Goal: Plan of Care Review  Outcome: Ongoing, Progressing  Goal: Patient-Specific Goal (Individualization)  Outcome: Ongoing, Progressing  Flowsheets (Taken 11/25/2020 1527)  Individualized Care Needs: feet elevated and warm blanket  Anxieties, Fears or Concerns: pt voices no concerns at this time  Patient-Specific Goals (Include Timeframe): to tolerate iron today  Goal: Optimal Comfort and Wellbeing  Outcome: Ongoing, Progressing     Problem: Fall Injury Risk  Goal: Absence of Fall and Fall-Related Injury  Outcome: Ongoing, Progressing     Problem: Anemia  Goal: Anemia Symptom Improvement  Outcome: Ongoing, Progressing   Patient reports, BLE edema

## 2020-12-07 ENCOUNTER — DOCUMENTATION ONLY (OUTPATIENT)
Dept: REHABILITATION | Facility: HOSPITAL | Age: 45
End: 2020-12-07

## 2020-12-07 PROBLEM — R26.89 BALANCE PROBLEM: Chronic | Status: RESOLVED | Noted: 2020-09-01 | Resolved: 2020-12-07

## 2020-12-07 PROBLEM — M25.60 LIMITED JOINT RANGE OF MOTION (ROM): Chronic | Status: RESOLVED | Noted: 2020-09-01 | Resolved: 2020-12-07

## 2020-12-07 PROBLEM — R29.898 DECREASED STRENGTH OF LOWER EXTREMITY: Status: RESOLVED | Noted: 2020-09-01 | Resolved: 2020-12-07

## 2020-12-07 NOTE — PROGRESS NOTES
Outpatient Therapy Discharge Summary     Name: Pedro Pablo Bazan  Clinic Number: [id number]    Therapy Diagnosis:        Encounter Diagnoses   Name Primary?    Decreased strength of lower extremity      Limited joint range of motion (ROM)      Balance problem        Physician: Oneal Acosta MD     Physician: Oneal Acosta MD     Physician Orders: PT Eval and Treat   Medical Diagnosis from Referral:   G71.00 (ICD-10-CM) - Muscular dystrophy   R53.1 (ICD-10-CM) - Weakness   R26.81 (ICD-10-CM) - Gait instability   W19.XXXD (ICD-10-CM) - Fall, subsequent encounter      Evaluation Date: 8/31/2020  Authorization Period Expiration: 12/31/20  Plan of Care Expiration: 10/26/20  Visit # / Visits authorized: 8/ 20       Precautions: Standard, anemia, hx of muscular dystrophy      Date of Last visit: 09/30/2020  Total Visits Received: 8  Cancelled Visits: 2  No Show Visits: 0    Assessment      This patient has not attended therapy since 09/30. This patient is now discharged from skilled outpatient therapy.    Goals:  Short Term Goals: 4 weeks  - Pt will demonstrate independence with prescribed HEP to facilitate healing and improve outcome measures. (met)  - Pt will increase RLE MMT values by at least a half grade to assist with activity tolerance and balance. (met)  - Pt will increase R shoulder ROM by 10 degrees in abduction and flexion in order to assist with lifting objects. (not met, pt stopped attending)  - Pt will improve R hamstring flexibility by 10 degrees during 90/90 test. (not met, pt stopped attending)     Long Term Goals: 8 weeks   - Pt will achieve TUG score of 15 seconds or less independently to demonstrate improved independence. (not met, pt stopped attending)  - Pt will be able to demonstrate BLE gross MMT values of at least 4+ to assist with gait, balance and ADL participation. (not met, pt stopped attending)  - Pt will be able to tolerate overhead UE activities with pain 5/10 or less to  assist with functional activities. (not met, pt stopped attending)    Discharge reason: Patient has not attended therapy since 09/30/2020.    Plan     This patient is discharged from Physical Therapy.    Rodo Neal PT, DPT

## 2020-12-18 ENCOUNTER — LAB VISIT (OUTPATIENT)
Dept: LAB | Facility: HOSPITAL | Age: 45
End: 2020-12-18
Attending: INTERNAL MEDICINE
Payer: COMMERCIAL

## 2020-12-18 DIAGNOSIS — D50.8 OTHER IRON DEFICIENCY ANEMIA: Chronic | ICD-10-CM

## 2020-12-18 LAB
ALBUMIN SERPL BCP-MCNC: 4.2 G/DL (ref 3.5–5.2)
ALP SERPL-CCNC: 64 U/L (ref 55–135)
ALT SERPL W/O P-5'-P-CCNC: 24 U/L (ref 10–44)
ANION GAP SERPL CALC-SCNC: 11 MMOL/L (ref 8–16)
AST SERPL-CCNC: 19 U/L (ref 10–40)
BASOPHILS # BLD AUTO: 0.03 K/UL (ref 0–0.2)
BASOPHILS NFR BLD: 0.5 % (ref 0–1.9)
BILIRUB SERPL-MCNC: 0.7 MG/DL (ref 0.1–1)
BUN SERPL-MCNC: 5 MG/DL (ref 6–20)
CALCIUM SERPL-MCNC: 9.1 MG/DL (ref 8.7–10.5)
CHLORIDE SERPL-SCNC: 100 MMOL/L (ref 95–110)
CO2 SERPL-SCNC: 28 MMOL/L (ref 23–29)
CREAT SERPL-MCNC: 0.8 MG/DL (ref 0.5–1.4)
DIFFERENTIAL METHOD: ABNORMAL
EOSINOPHIL # BLD AUTO: 0 K/UL (ref 0–0.5)
EOSINOPHIL NFR BLD: 0.7 % (ref 0–8)
ERYTHROCYTE [DISTWIDTH] IN BLOOD BY AUTOMATED COUNT: 19.8 % (ref 11.5–14.5)
EST. GFR  (AFRICAN AMERICAN): >60 ML/MIN/1.73 M^2
EST. GFR  (NON AFRICAN AMERICAN): >60 ML/MIN/1.73 M^2
FERRITIN SERPL-MCNC: 291 NG/ML (ref 20–300)
GLUCOSE SERPL-MCNC: 89 MG/DL (ref 70–110)
HCT VFR BLD AUTO: 41.1 % (ref 37–48.5)
HGB BLD-MCNC: 13.3 G/DL (ref 12–16)
IMM GRANULOCYTES # BLD AUTO: 0.01 K/UL (ref 0–0.04)
IMM GRANULOCYTES NFR BLD AUTO: 0.2 % (ref 0–0.5)
IRON SERPL-MCNC: 77 UG/DL (ref 30–160)
LYMPHOCYTES # BLD AUTO: 1.3 K/UL (ref 1–4.8)
LYMPHOCYTES NFR BLD: 21.9 % (ref 18–48)
MCH RBC QN AUTO: 28.1 PG (ref 27–31)
MCHC RBC AUTO-ENTMCNC: 32.4 G/DL (ref 32–36)
MCV RBC AUTO: 87 FL (ref 82–98)
MONOCYTES # BLD AUTO: 0.5 K/UL (ref 0.3–1)
MONOCYTES NFR BLD: 9 % (ref 4–15)
NEUTROPHILS # BLD AUTO: 4.1 K/UL (ref 1.8–7.7)
NEUTROPHILS NFR BLD: 67.9 % (ref 38–73)
NRBC BLD-RTO: 0 /100 WBC
PLATELET # BLD AUTO: 246 K/UL (ref 150–350)
PMV BLD AUTO: 9.8 FL (ref 9.2–12.9)
POTASSIUM SERPL-SCNC: 3.3 MMOL/L (ref 3.5–5.1)
PROT SERPL-MCNC: 7.7 G/DL (ref 6–8.4)
RBC # BLD AUTO: 4.74 M/UL (ref 4–5.4)
SATURATED IRON: 25 % (ref 20–50)
SODIUM SERPL-SCNC: 139 MMOL/L (ref 136–145)
TOTAL IRON BINDING CAPACITY: 314 UG/DL (ref 250–450)
TRANSFERRIN SERPL-MCNC: 212 MG/DL (ref 200–375)
WBC # BLD AUTO: 5.99 K/UL (ref 3.9–12.7)

## 2020-12-18 PROCEDURE — 83020 HEMOGLOBIN ELECTROPHORESIS: CPT

## 2020-12-18 PROCEDURE — 85025 COMPLETE CBC W/AUTO DIFF WBC: CPT | Mod: PO

## 2020-12-18 PROCEDURE — 80053 COMPREHEN METABOLIC PANEL: CPT

## 2020-12-18 PROCEDURE — 83540 ASSAY OF IRON: CPT

## 2020-12-18 PROCEDURE — 82728 ASSAY OF FERRITIN: CPT

## 2020-12-18 PROCEDURE — 36415 COLL VENOUS BLD VENIPUNCTURE: CPT | Mod: PO

## 2020-12-21 LAB
HGB A2 MFR BLD HPLC: 2.6 % (ref 2.2–3.2)
HGB FRACT BLD ELPH-IMP: NORMAL
HGB FRACT BLD ELPH-IMP: NORMAL

## 2020-12-30 ENCOUNTER — PATIENT OUTREACH (OUTPATIENT)
Dept: ADMINISTRATIVE | Facility: OTHER | Age: 45
End: 2020-12-30

## 2021-03-04 ENCOUNTER — TELEPHONE (OUTPATIENT)
Dept: ORTHOPEDICS | Facility: CLINIC | Age: 46
End: 2021-03-04

## 2021-03-15 ENCOUNTER — PATIENT OUTREACH (OUTPATIENT)
Dept: ADMINISTRATIVE | Facility: OTHER | Age: 46
End: 2021-03-15

## 2021-03-17 ENCOUNTER — OFFICE VISIT (OUTPATIENT)
Dept: ORTHOPEDICS | Facility: CLINIC | Age: 46
End: 2021-03-17
Payer: MEDICAID

## 2021-03-17 VITALS — BODY MASS INDEX: 21.85 KG/M2 | HEIGHT: 64 IN | WEIGHT: 128 LBS | RESPIRATION RATE: 16 BRPM

## 2021-03-17 DIAGNOSIS — M75.100 ROTATOR CUFF SYNDROME, UNSPECIFIED LATERALITY: ICD-10-CM

## 2021-03-17 DIAGNOSIS — M25.511 ACUTE PAIN OF RIGHT SHOULDER: Primary | ICD-10-CM

## 2021-03-17 PROCEDURE — 99213 OFFICE O/P EST LOW 20 MIN: CPT | Mod: 25,S$PBB,, | Performed by: ORTHOPAEDIC SURGERY

## 2021-03-17 PROCEDURE — 20610 DRAIN/INJ JOINT/BURSA W/O US: CPT | Mod: PBBFAC,PN | Performed by: ORTHOPAEDIC SURGERY

## 2021-03-17 PROCEDURE — 99999 PR PBB SHADOW E&M-EST. PATIENT-LVL III: ICD-10-PCS | Mod: PBBFAC,,, | Performed by: ORTHOPAEDIC SURGERY

## 2021-03-17 PROCEDURE — 99999 PR PBB SHADOW E&M-EST. PATIENT-LVL III: CPT | Mod: PBBFAC,,, | Performed by: ORTHOPAEDIC SURGERY

## 2021-03-17 PROCEDURE — 99213 PR OFFICE/OUTPT VISIT, EST, LEVL III, 20-29 MIN: ICD-10-PCS | Mod: 25,S$PBB,, | Performed by: ORTHOPAEDIC SURGERY

## 2021-03-17 PROCEDURE — 20610 LARGE JOINT ASPIRATION/INJECTION: R SUBACROMIAL BURSA: ICD-10-PCS | Mod: S$PBB,RT,, | Performed by: ORTHOPAEDIC SURGERY

## 2021-03-17 PROCEDURE — 99213 OFFICE O/P EST LOW 20 MIN: CPT | Mod: PBBFAC,PN | Performed by: ORTHOPAEDIC SURGERY

## 2021-03-17 RX ORDER — TRIAMCINOLONE ACETONIDE 40 MG/ML
40 INJECTION, SUSPENSION INTRA-ARTICULAR; INTRAMUSCULAR
Status: DISCONTINUED | OUTPATIENT
Start: 2021-03-17 | End: 2021-03-17 | Stop reason: HOSPADM

## 2021-03-17 RX ADMIN — TRIAMCINOLONE ACETONIDE 40 MG: 40 INJECTION, SUSPENSION INTRA-ARTICULAR; INTRAMUSCULAR at 10:03

## 2021-03-24 ENCOUNTER — LAB VISIT (OUTPATIENT)
Dept: LAB | Facility: HOSPITAL | Age: 46
End: 2021-03-24
Attending: FAMILY MEDICINE
Payer: MEDICAID

## 2021-03-24 ENCOUNTER — TELEPHONE (OUTPATIENT)
Dept: FAMILY MEDICINE | Facility: CLINIC | Age: 46
End: 2021-03-24

## 2021-03-24 ENCOUNTER — OFFICE VISIT (OUTPATIENT)
Dept: FAMILY MEDICINE | Facility: CLINIC | Age: 46
End: 2021-03-24
Payer: MEDICAID

## 2021-03-24 ENCOUNTER — TELEPHONE (OUTPATIENT)
Dept: NEUROLOGY | Facility: CLINIC | Age: 46
End: 2021-03-24

## 2021-03-24 VITALS
HEART RATE: 77 BPM | TEMPERATURE: 98 F | DIASTOLIC BLOOD PRESSURE: 86 MMHG | SYSTOLIC BLOOD PRESSURE: 136 MMHG | WEIGHT: 131 LBS | BODY MASS INDEX: 22.36 KG/M2 | HEIGHT: 64 IN

## 2021-03-24 DIAGNOSIS — Z13.6 ENCOUNTER FOR LIPID SCREENING FOR CARDIOVASCULAR DISEASE: ICD-10-CM

## 2021-03-24 DIAGNOSIS — Z13.220 ENCOUNTER FOR LIPID SCREENING FOR CARDIOVASCULAR DISEASE: ICD-10-CM

## 2021-03-24 DIAGNOSIS — Z00.01 ENCOUNTER FOR GENERAL ADULT MEDICAL EXAMINATION WITH ABNORMAL FINDINGS: Primary | ICD-10-CM

## 2021-03-24 DIAGNOSIS — Z00.01 ENCOUNTER FOR GENERAL ADULT MEDICAL EXAMINATION WITH ABNORMAL FINDINGS: ICD-10-CM

## 2021-03-24 DIAGNOSIS — G71.00 MUSCULAR DYSTROPHY: Chronic | ICD-10-CM

## 2021-03-24 DIAGNOSIS — R26.81 GAIT INSTABILITY: Chronic | ICD-10-CM

## 2021-03-24 DIAGNOSIS — I10 HYPERTENSION, ESSENTIAL: Chronic | ICD-10-CM

## 2021-03-24 DIAGNOSIS — D50.8 OTHER IRON DEFICIENCY ANEMIA: ICD-10-CM

## 2021-03-24 DIAGNOSIS — M54.2 NECK PAIN: ICD-10-CM

## 2021-03-24 DIAGNOSIS — Z79.899 ENCOUNTER FOR LONG-TERM (CURRENT) USE OF MEDICATIONS: ICD-10-CM

## 2021-03-24 LAB
ALBUMIN SERPL BCP-MCNC: 4.4 G/DL (ref 3.5–5.2)
ALP SERPL-CCNC: 61 U/L (ref 55–135)
ALT SERPL W/O P-5'-P-CCNC: 23 U/L (ref 10–44)
ANION GAP SERPL CALC-SCNC: 9 MMOL/L (ref 8–16)
AST SERPL-CCNC: 17 U/L (ref 10–40)
BILIRUB SERPL-MCNC: 0.8 MG/DL (ref 0.1–1)
BUN SERPL-MCNC: 8 MG/DL (ref 6–20)
CALCIUM SERPL-MCNC: 9.1 MG/DL (ref 8.7–10.5)
CHLORIDE SERPL-SCNC: 101 MMOL/L (ref 95–110)
CO2 SERPL-SCNC: 29 MMOL/L (ref 23–29)
CREAT SERPL-MCNC: 1 MG/DL (ref 0.5–1.4)
ERYTHROCYTE [DISTWIDTH] IN BLOOD BY AUTOMATED COUNT: 12.5 % (ref 11.5–14.5)
EST. GFR  (AFRICAN AMERICAN): >60 ML/MIN/1.73 M^2
EST. GFR  (NON AFRICAN AMERICAN): >60 ML/MIN/1.73 M^2
ESTIMATED AVG GLUCOSE: 97 MG/DL (ref 68–131)
GLUCOSE SERPL-MCNC: 95 MG/DL (ref 70–110)
HBA1C MFR BLD: 5 % (ref 4–5.6)
HCT VFR BLD AUTO: 47.1 % (ref 37–48.5)
HGB BLD-MCNC: 15 G/DL (ref 12–16)
MCH RBC QN AUTO: 30.6 PG (ref 27–31)
MCHC RBC AUTO-ENTMCNC: 31.8 G/DL (ref 32–36)
MCV RBC AUTO: 96 FL (ref 82–98)
PLATELET # BLD AUTO: 293 K/UL (ref 150–350)
PMV BLD AUTO: 11 FL (ref 9.2–12.9)
POTASSIUM SERPL-SCNC: 3.4 MMOL/L (ref 3.5–5.1)
PROT SERPL-MCNC: 8.1 G/DL (ref 6–8.4)
RBC # BLD AUTO: 4.9 M/UL (ref 4–5.4)
SODIUM SERPL-SCNC: 139 MMOL/L (ref 136–145)
TSH SERPL DL<=0.005 MIU/L-ACNC: 1.4 UIU/ML (ref 0.4–4)
WBC # BLD AUTO: 7.57 K/UL (ref 3.9–12.7)

## 2021-03-24 PROCEDURE — 36415 COLL VENOUS BLD VENIPUNCTURE: CPT | Mod: PO | Performed by: FAMILY MEDICINE

## 2021-03-24 PROCEDURE — 80053 COMPREHEN METABOLIC PANEL: CPT | Performed by: FAMILY MEDICINE

## 2021-03-24 PROCEDURE — 85027 COMPLETE CBC AUTOMATED: CPT | Performed by: FAMILY MEDICINE

## 2021-03-24 PROCEDURE — 99396 PR PREVENTIVE VISIT,EST,40-64: ICD-10-PCS | Mod: S$PBB,,, | Performed by: FAMILY MEDICINE

## 2021-03-24 PROCEDURE — 84443 ASSAY THYROID STIM HORMONE: CPT | Performed by: FAMILY MEDICINE

## 2021-03-24 PROCEDURE — 99999 PR PBB SHADOW E&M-EST. PATIENT-LVL V: ICD-10-PCS | Mod: PBBFAC,,, | Performed by: FAMILY MEDICINE

## 2021-03-24 PROCEDURE — 99396 PREV VISIT EST AGE 40-64: CPT | Mod: S$PBB,,, | Performed by: FAMILY MEDICINE

## 2021-03-24 PROCEDURE — 99999 PR PBB SHADOW E&M-EST. PATIENT-LVL V: CPT | Mod: PBBFAC,,, | Performed by: FAMILY MEDICINE

## 2021-03-24 PROCEDURE — 99215 OFFICE O/P EST HI 40 MIN: CPT | Mod: PBBFAC,PO | Performed by: FAMILY MEDICINE

## 2021-03-24 PROCEDURE — 83036 HEMOGLOBIN GLYCOSYLATED A1C: CPT | Performed by: FAMILY MEDICINE

## 2021-03-24 RX ORDER — ACETAMINOPHEN 500 MG
1000 TABLET ORAL EVERY 8 HOURS PRN
Refills: 0
Start: 2021-03-24

## 2021-03-24 RX ORDER — DICLOFENAC SODIUM 75 MG/1
75 TABLET, DELAYED RELEASE ORAL 2 TIMES DAILY
Qty: 60 TABLET | Refills: 12 | Status: SHIPPED | OUTPATIENT
Start: 2021-03-24 | End: 2022-04-18

## 2021-03-25 ENCOUNTER — TELEPHONE (OUTPATIENT)
Dept: FAMILY MEDICINE | Facility: CLINIC | Age: 46
End: 2021-03-25

## 2021-03-30 ENCOUNTER — TELEPHONE (OUTPATIENT)
Dept: ADMINISTRATIVE | Facility: HOSPITAL | Age: 46
End: 2021-03-30

## 2021-04-29 ENCOUNTER — PATIENT MESSAGE (OUTPATIENT)
Dept: RESEARCH | Facility: HOSPITAL | Age: 46
End: 2021-04-29

## 2021-05-05 ENCOUNTER — TELEPHONE (OUTPATIENT)
Dept: NEUROLOGY | Facility: CLINIC | Age: 46
End: 2021-05-05

## 2021-05-17 ENCOUNTER — TELEPHONE (OUTPATIENT)
Dept: FAMILY MEDICINE | Facility: CLINIC | Age: 46
End: 2021-05-17

## 2021-05-17 ENCOUNTER — HOSPITAL ENCOUNTER (OUTPATIENT)
Dept: RADIOLOGY | Facility: HOSPITAL | Age: 46
Discharge: HOME | End: 2021-05-17
Attending: FAMILY MEDICINE
Payer: MEDICAID

## 2021-05-17 VITALS — WEIGHT: 130.94 LBS | BODY MASS INDEX: 22.35 KG/M2 | HEIGHT: 64 IN

## 2021-05-17 DIAGNOSIS — Z12.31 ENCOUNTER FOR SCREENING MAMMOGRAM FOR BREAST CANCER: ICD-10-CM

## 2021-05-17 DIAGNOSIS — Z79.899 ENCOUNTER FOR LONG-TERM (CURRENT) USE OF MEDICATIONS: ICD-10-CM

## 2021-05-17 DIAGNOSIS — Z00.01 ENCOUNTER FOR GENERAL ADULT MEDICAL EXAMINATION WITH ABNORMAL FINDINGS: ICD-10-CM

## 2021-05-17 PROCEDURE — 77067 SCR MAMMO BI INCL CAD: CPT | Mod: 26,,, | Performed by: RADIOLOGY

## 2021-05-17 PROCEDURE — 77063 MAMMO DIGITAL SCREENING BILAT WITH TOMOSYNTHESIS_CAD: ICD-10-PCS | Mod: 26,,, | Performed by: RADIOLOGY

## 2021-05-17 PROCEDURE — 77067 MAMMO DIGITAL SCREENING BILAT WITH TOMOSYNTHESIS_CAD: ICD-10-PCS | Mod: 26,,, | Performed by: RADIOLOGY

## 2021-05-17 PROCEDURE — 77067 SCR MAMMO BI INCL CAD: CPT | Mod: TC,PO

## 2021-05-17 PROCEDURE — 77063 BREAST TOMOSYNTHESIS BI: CPT | Mod: 26,,, | Performed by: RADIOLOGY

## 2022-03-18 ENCOUNTER — PATIENT MESSAGE (OUTPATIENT)
Dept: ADMINISTRATIVE | Facility: HOSPITAL | Age: 47
End: 2022-03-18
Payer: MEDICAID

## 2022-04-27 ENCOUNTER — PATIENT MESSAGE (OUTPATIENT)
Dept: ADMINISTRATIVE | Facility: HOSPITAL | Age: 47
End: 2022-04-27
Payer: MEDICAID

## 2022-05-25 DIAGNOSIS — Z12.31 OTHER SCREENING MAMMOGRAM: ICD-10-CM

## 2022-05-31 ENCOUNTER — PATIENT MESSAGE (OUTPATIENT)
Dept: FAMILY MEDICINE | Facility: CLINIC | Age: 47
End: 2022-05-31
Payer: MEDICAID

## 2022-06-07 ENCOUNTER — PATIENT OUTREACH (OUTPATIENT)
Dept: ADMINISTRATIVE | Facility: HOSPITAL | Age: 47
End: 2022-06-07
Payer: MEDICAID

## 2022-06-07 NOTE — PROGRESS NOTES
Phone Outreach not seen 12 months: Called Pt to schedule PCP visit & overdue HM, Both numbers out of service. No Care Everywhere or Outside Record Request updates available.

## 2022-06-08 ENCOUNTER — PATIENT OUTREACH (OUTPATIENT)
Dept: ADMINISTRATIVE | Facility: HOSPITAL | Age: 47
End: 2022-06-08
Payer: MEDICAID

## 2022-07-27 ENCOUNTER — PATIENT MESSAGE (OUTPATIENT)
Dept: ADMINISTRATIVE | Facility: HOSPITAL | Age: 47
End: 2022-07-27
Payer: MEDICAID

## 2022-09-26 ENCOUNTER — OFFICE VISIT (OUTPATIENT)
Dept: FAMILY MEDICINE | Facility: CLINIC | Age: 47
End: 2022-09-26
Payer: MEDICAID

## 2022-09-26 VITALS
HEIGHT: 64 IN | TEMPERATURE: 98 F | HEART RATE: 63 BPM | SYSTOLIC BLOOD PRESSURE: 158 MMHG | DIASTOLIC BLOOD PRESSURE: 96 MMHG | WEIGHT: 146.19 LBS | BODY MASS INDEX: 24.96 KG/M2

## 2022-09-26 DIAGNOSIS — Z09 FOLLOW UP: Primary | ICD-10-CM

## 2022-09-26 DIAGNOSIS — M25.512 BILATERAL SHOULDER PAIN, UNSPECIFIED CHRONICITY: ICD-10-CM

## 2022-09-26 DIAGNOSIS — Z76.0 MEDICATION REFILL: ICD-10-CM
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
## 2022-09-26 DIAGNOSIS — M54.2 CERVICALGIA: ICD-10-CM

## 2022-09-26 DIAGNOSIS — I10 HYPERTENSION, ESSENTIAL: ICD-10-CM

## 2022-09-26 DIAGNOSIS — M25.511 BILATERAL SHOULDER PAIN, UNSPECIFIED CHRONICITY: ICD-10-CM

## 2022-09-26 PROCEDURE — 99214 OFFICE O/P EST MOD 30 MIN: CPT | Mod: S$PBB,,, | Performed by: NURSE PRACTITIONER

## 2022-09-26 PROCEDURE — 99215 OFFICE O/P EST HI 40 MIN: CPT | Mod: PBBFAC,PO | Performed by: NURSE PRACTITIONER

## 2022-09-26 PROCEDURE — 99214 PR OFFICE/OUTPT VISIT, EST, LEVL IV, 30-39 MIN: ICD-10-PCS | Mod: S$PBB,,, | Performed by: NURSE PRACTITIONER

## 2022-09-26 PROCEDURE — 99999 PR PBB SHADOW E&M-EST. PATIENT-LVL V: ICD-10-PCS | Mod: PBBFAC,,, | Performed by: NURSE PRACTITIONER

## 2022-09-26 PROCEDURE — 99999 PR PBB SHADOW E&M-EST. PATIENT-LVL V: CPT | Mod: PBBFAC,,, | Performed by: NURSE PRACTITIONER

## 2022-09-26 RX ORDER — AMLODIPINE BESYLATE 5 MG/1
5 TABLET ORAL DAILY
Qty: 30 TABLET | Refills: 0 | Status: SHIPPED | OUTPATIENT
Start: 2022-09-26

## 2022-09-26 RX ORDER — TRAMADOL HYDROCHLORIDE 50 MG/1
50 TABLET ORAL EVERY 8 HOURS PRN
Qty: 15 TABLET | Refills: 0 | Status: SHIPPED | OUTPATIENT
Start: 2022-09-26 | End: 2022-10-01

## 2022-09-26 NOTE — PATIENT INSTRUCTIONS
Monitor BP daily; keep log x 1 w  Return log to clinic for review  RTC for nurse BP check in 1 w  Ultram request sent to PCP to approve  Report to ER immediately if symptoms worsen or persist

## 2022-10-03 ENCOUNTER — TELEPHONE (OUTPATIENT)
Dept: FAMILY MEDICINE | Facility: CLINIC | Age: 47
End: 2022-10-03
Payer: MEDICAID

## 2022-10-03 ENCOUNTER — CLINICAL SUPPORT (OUTPATIENT)
Dept: FAMILY MEDICINE | Facility: CLINIC | Age: 47
End: 2022-10-03
Payer: MEDICAID

## 2022-10-03 VITALS — OXYGEN SATURATION: 99 % | DIASTOLIC BLOOD PRESSURE: 86 MMHG | SYSTOLIC BLOOD PRESSURE: 138 MMHG | HEART RATE: 78 BPM

## 2022-10-03 DIAGNOSIS — I10 HYPERTENSION, ESSENTIAL: Primary | ICD-10-CM

## 2022-10-03 PROCEDURE — 99212 OFFICE O/P EST SF 10 MIN: CPT | Mod: PBBFAC,PO

## 2022-10-03 PROCEDURE — 99999 PR PBB SHADOW E&M-EST. PATIENT-LVL II: ICD-10-PCS | Mod: PBBFAC,,,

## 2022-10-03 PROCEDURE — 99999 PR PBB SHADOW E&M-EST. PATIENT-LVL II: CPT | Mod: PBBFAC,,,

## 2022-10-03 NOTE — PROGRESS NOTES
Patient here today for BP check.  Reviewed medications and allergies.  BP has been documented, patient also left list of BP readings from home.  Patient did not bring home cuff for comparison, but I advised her to bring it with her next time.  Today's BP reading was within normal limits, but the list of readings that patient brought in were all high.patient states that she uses the wrist cuff BP machine, she will bring to next office visit for comparison.

## 2022-10-03 NOTE — Clinical Note
Vital signs are within normal limits.  Continue current medication regimen.  Follow-up in office to discuss medications.

## 2022-10-03 NOTE — Clinical Note
Patient here today for BP check.  Reviewed medications and allergies.  BP has been documented, patient also left list of BP readings from home.  Patient did not bring home cuff for comparison, but I advised her to bring it with her next time.

## 2022-10-03 NOTE — TELEPHONE ENCOUNTER
----- Message from Hakeem Thorne sent at 10/3/2022  9:27 AM CDT -----  Contact: [phone]  Pt is calling regarding nurse visit today . Please call back at [phone] . Thanks/dj

## 2022-10-06 NOTE — PATIENT INSTRUCTIONS
Follow up if symptoms worsen or fail to improve, for HTN.     Dear patient,   As a result of recent federal legislation (The Federal Cures Act), you may receive lab or pathology results from your visit in your [facility] account before your physician is able to contact you. Your physician or their representative will relay the results to you with their recommendations at their soonest availability.     If no improvement in symptoms or symptoms worsen, please be advised to call MD, follow-up at clinic and/or go to ER if becomes severe.    Oneal Acosta M.D.        We Offer TELEHEALTH & Same Day Appointments!   Book your Telehealth appointment with me through my nurse or   Clinic appointments on [employer]!    [address] [place] [address]    Office: [phone]   FAX: [phone]    Check out my Facebook Page and Follow Me at: [email or web address]    Check out my website at [employer] by clicking on: [email or web address]    To Schedule appointments online, go to [facility]: [email or web address]

## 2022-12-20 DIAGNOSIS — Z12.31 OTHER SCREENING MAMMOGRAM: ICD-10-CM

## 2023-01-25 ENCOUNTER — PATIENT MESSAGE (OUTPATIENT)
Dept: ADMINISTRATIVE | Facility: HOSPITAL | Age: 48
End: 2023-01-25
Payer: MEDICAID

## 2023-03-07 NOTE — PROGRESS NOTES
LINKS immunization registry not responding  Care Everywhere updated  Health Maintenance updated  Chart reviewed for overdue Proactive [facility] Encounters (FRANCES) health maintenance testing (CRS, Breast Ca, Diabetic Eye Exam)   Orders entered:N/A  
no concerns

## 2023-04-19 ENCOUNTER — PATIENT MESSAGE (OUTPATIENT)
Dept: ADMINISTRATIVE | Facility: HOSPITAL | Age: 48
End: 2023-04-19
Payer: MEDICAID

## 2024-03-29 ENCOUNTER — PATIENT MESSAGE (OUTPATIENT)
Dept: FAMILY MEDICINE | Facility: CLINIC | Age: 49
End: 2024-03-29
Payer: MEDICAID

## 2024-09-19 ENCOUNTER — TELEPHONE (OUTPATIENT)
Dept: FAMILY MEDICINE | Facility: CLINIC | Age: 49
End: 2024-09-19
Payer: MEDICAID